# Patient Record
Sex: MALE | Race: OTHER | HISPANIC OR LATINO | Employment: OTHER | URBAN - METROPOLITAN AREA
[De-identification: names, ages, dates, MRNs, and addresses within clinical notes are randomized per-mention and may not be internally consistent; named-entity substitution may affect disease eponyms.]

---

## 2023-11-07 PROBLEM — H92.13 OTORRHEA OF BOTH EARS: Status: ACTIVE | Noted: 2023-11-07

## 2023-11-07 PROBLEM — H66.90 CHRONIC OTITIS MEDIA: Status: ACTIVE | Noted: 2023-11-07

## 2023-11-07 PROBLEM — G82.50 SPASTIC QUADRIPLEGIA (MULTI): Status: ACTIVE | Noted: 2023-11-07

## 2023-11-07 PROBLEM — Q26.2: Status: ACTIVE | Noted: 2023-11-07

## 2023-11-07 PROBLEM — G80.9 CEREBRAL PALSY (MULTI): Status: ACTIVE | Noted: 2023-11-07

## 2023-11-07 PROBLEM — Q37.9 CLEFT PALATE WITH CLEFT LIP (HHS-HCC): Status: ACTIVE | Noted: 2023-11-07

## 2023-11-07 PROBLEM — F72 SEVERE INTELLECTUAL DISABILITY: Status: ACTIVE | Noted: 2023-11-07

## 2023-11-07 PROBLEM — K21.9 ESOPHAGEAL REFLUX: Status: ACTIVE | Noted: 2023-11-07

## 2023-11-07 PROBLEM — R56.9 SEIZURES (MULTI): Status: ACTIVE | Noted: 2023-11-07

## 2023-11-07 PROBLEM — G40.919 INTRACTABLE EPILEPSY WITHOUT STATUS EPILEPTICUS (MULTI): Status: ACTIVE | Noted: 2023-11-07

## 2023-11-07 PROBLEM — H66.3X3 CHRONIC SUPPURATIVE OTITIS MEDIA OF BOTH EARS: Status: ACTIVE | Noted: 2023-11-07

## 2023-11-07 PROBLEM — R10.9 ABDOMINAL PAIN: Status: ACTIVE | Noted: 2023-11-07

## 2023-11-07 PROBLEM — F88 GLOBAL DEVELOPMENTAL DELAY: Status: ACTIVE | Noted: 2023-11-07

## 2023-11-07 PROBLEM — Z96.22 RETAINED BILATERAL MYRINGOTOMY TUBES: Status: ACTIVE | Noted: 2023-11-07

## 2023-11-07 PROBLEM — K11.7 SIALORRHEA: Status: ACTIVE | Noted: 2023-11-07

## 2023-11-07 PROBLEM — R79.89 LOW VITAMIN D LEVEL: Status: ACTIVE | Noted: 2023-11-07

## 2023-11-07 PROBLEM — F80.2 MIXED RECEPTIVE-EXPRESSIVE LANGUAGE DISORDER: Status: ACTIVE | Noted: 2023-11-07

## 2023-11-07 RX ORDER — BISACODYL 5 MG
TABLET, DELAYED RELEASE (ENTERIC COATED) ORAL
COMMUNITY
Start: 2016-07-13

## 2023-11-07 RX ORDER — LIDOCAINE AND PRILOCAINE 25; 25 MG/G; MG/G
CREAM TOPICAL
COMMUNITY
Start: 2018-05-31 | End: 2023-11-08

## 2023-11-07 RX ORDER — POLYETHYLENE GLYCOL 3350 17 G/17G
17 POWDER, FOR SOLUTION ORAL
COMMUNITY
Start: 2021-09-08

## 2023-11-07 RX ORDER — OXCARBAZEPINE 300 MG/1
TABLET, FILM COATED ORAL
COMMUNITY
Start: 2019-04-29 | End: 2023-11-08 | Stop reason: SDUPTHER

## 2023-11-07 RX ORDER — GLYCERIN 1 G/1
1 SUPPOSITORY RECTAL ONCE AS NEEDED
COMMUNITY
Start: 2014-01-22

## 2023-11-07 RX ORDER — FLUTICASONE PROPIONATE 50 MCG
SPRAY, SUSPENSION (ML) NASAL
COMMUNITY

## 2023-11-07 RX ORDER — OMEPRAZOLE 20 MG/1
1 TABLET, DELAYED RELEASE ORAL DAILY
COMMUNITY
End: 2023-11-08

## 2023-11-07 RX ORDER — DIAZEPAM 10 MG/2G
GEL RECTAL
COMMUNITY
Start: 2019-09-04

## 2023-11-07 RX ORDER — OMEPRAZOLE 20 MG/1
1 CAPSULE, DELAYED RELEASE ORAL DAILY
COMMUNITY
Start: 2014-12-15

## 2023-11-07 RX ORDER — CALCIUM CARBONATE/VITAMIN D3 600MG-5MCG
1 TABLET ORAL DAILY
COMMUNITY
Start: 2021-10-18 | End: 2023-11-08

## 2023-11-08 ENCOUNTER — TELEMEDICINE (OUTPATIENT)
Dept: NEUROLOGY | Facility: CLINIC | Age: 33
End: 2023-11-08
Payer: MEDICAID

## 2023-11-08 DIAGNOSIS — R56.9 SEIZURES (MULTI): ICD-10-CM

## 2023-11-08 DIAGNOSIS — G40.919 REFRACTORY EPILEPSY (MULTI): Primary | ICD-10-CM

## 2023-11-08 PROCEDURE — 99214 OFFICE O/P EST MOD 30 MIN: CPT | Mod: GT,PO | Performed by: PSYCHIATRY & NEUROLOGY

## 2023-11-08 PROCEDURE — 99204 OFFICE O/P NEW MOD 45 MIN: CPT | Performed by: PSYCHIATRY & NEUROLOGY

## 2023-11-08 RX ORDER — OXCARBAZEPINE 300 MG/1
TABLET, FILM COATED ORAL
Qty: 120 TABLET | Refills: 11 | Status: SHIPPED | OUTPATIENT
Start: 2023-11-08

## 2023-11-08 NOTE — PROGRESS NOTES
Mr. John is a 34 y/o with history of CP and seizures who presents today for follow-up.      Epilepsy Classification:  Epileptic Paroxysmal Episodes  Epileptogenic Zone: Likely Generalized  Epileptic seizure semiology:  1. Type 1: Bilateral arm clonic            Frequency: one in the last year   Etiology: Cerebral palsy                  Significant Comorbidities: CP, constipation  Onset date: Age 6  Diagnosis date: Age 6  Previous disease therapies: Keppra (Increased somnolence), Depakote (increased somnolence)  Current disease therapies: Oxcarbazepine 600 mg BID for months (2 tablets twice a day)     Today he presents with mom for a virtual visit.  While reviewing his medications mom informed patient has been taking OXC 600mg bid not 750mg bid for almost 2 years, this was adjusted down at [facility] after bloodwork. He has been doing well on this dose.  His current seizures are mild, lasting for 5 seconds only, may happen twice a week in the setting of constipation. Seizures may get worse if he gets sick with fever or ear infection.   His seizures have been stable for years. Mother is happy with overall control which is the best he has had since he was a child. He tried many different medications in the past. He loves watching TV and listing to music.     - We will continue Oxcarbazepine 600 mg BID- sent refills   - Mom provided with contact information and is to call if there's any increase in seizure frequency or any other concerns  -RTC annually    ------------------------------------------------------------------------------------------------------------------------------  Previous seizure history:    Mr. John is a 34 y/o with history of CP, constipation, and seizures who presents to [facility] care for. Sz onset at age 6. He had clonic seizures with cyanosis as a child.  Developed intractable epilepsy, tried multiple medications, Depakote among many other. Currently the oxcarbazepine, she feels is the  medication that gave him best seizure control without producing any significant side effects.      He has been followed initially by pediatric neurology, Doctors Spencer Brown and Elvin. He also saw Dr. Vogel of clinical genetics on at least one occasion.      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 Through comprehensive care referrals he is being followed by a number of services including pediatric G.I., plastic surgery, genetics and will be seeing pediatric orthopedics. 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

## 2024-09-14 ENCOUNTER — HOSPITAL ENCOUNTER (EMERGENCY)
Facility: HOSPITAL | Age: 34
Discharge: HOME | End: 2024-09-14
Attending: STUDENT IN AN ORGANIZED HEALTH CARE EDUCATION/TRAINING PROGRAM
Payer: MEDICAID

## 2024-09-14 ENCOUNTER — APPOINTMENT (OUTPATIENT)
Dept: RADIOLOGY | Facility: HOSPITAL | Age: 34
End: 2024-09-14
Payer: MEDICAID

## 2024-09-14 VITALS
OXYGEN SATURATION: 98 % | HEIGHT: 62 IN | RESPIRATION RATE: 16 BRPM | TEMPERATURE: 97.9 F | WEIGHT: 86 LBS | BODY MASS INDEX: 15.83 KG/M2 | HEART RATE: 98 BPM | SYSTOLIC BLOOD PRESSURE: 137 MMHG | DIASTOLIC BLOOD PRESSURE: 110 MMHG

## 2024-09-14 DIAGNOSIS — H65.92 OTHER NONSUPPURATIVE OTITIS MEDIA OF LEFT EAR, UNSPECIFIED CHRONICITY: Primary | ICD-10-CM

## 2024-09-14 DIAGNOSIS — K59.00 CONSTIPATION, UNSPECIFIED CONSTIPATION TYPE: ICD-10-CM

## 2024-09-14 DIAGNOSIS — R10.9 ABDOMINAL PAIN, UNSPECIFIED ABDOMINAL LOCATION: ICD-10-CM

## 2024-09-14 LAB
ALBUMIN SERPL BCP-MCNC: 5 G/DL (ref 3.4–5)
ALP SERPL-CCNC: 99 U/L (ref 33–120)
ALT SERPL W P-5'-P-CCNC: 42 U/L (ref 10–52)
ANION GAP SERPL CALC-SCNC: 15 MMOL/L (ref 10–20)
APPEARANCE UR: CLEAR
AST SERPL W P-5'-P-CCNC: 16 U/L (ref 9–39)
BASOPHILS # BLD AUTO: 0.03 X10*3/UL (ref 0–0.1)
BASOPHILS NFR BLD AUTO: 0.5 %
BILIRUB DIRECT SERPL-MCNC: 0.1 MG/DL (ref 0–0.3)
BILIRUB SERPL-MCNC: 0.4 MG/DL (ref 0–1.2)
BILIRUB UR STRIP.AUTO-MCNC: NEGATIVE MG/DL
BUN SERPL-MCNC: 7 MG/DL (ref 6–23)
CALCIUM SERPL-MCNC: 9.8 MG/DL (ref 8.6–10.6)
CHLORIDE SERPL-SCNC: 96 MMOL/L (ref 98–107)
CO2 SERPL-SCNC: 24 MMOL/L (ref 21–32)
COLOR UR: ABNORMAL
CREAT SERPL-MCNC: 0.6 MG/DL (ref 0.5–1.3)
EGFRCR SERPLBLD CKD-EPI 2021: >90 ML/MIN/1.73M*2
EOSINOPHIL # BLD AUTO: 0.03 X10*3/UL (ref 0–0.7)
EOSINOPHIL NFR BLD AUTO: 0.5 %
ERYTHROCYTE [DISTWIDTH] IN BLOOD BY AUTOMATED COUNT: 12.1 % (ref 11.5–14.5)
GLUCOSE SERPL-MCNC: 98 MG/DL (ref 74–99)
GLUCOSE UR STRIP.AUTO-MCNC: NORMAL MG/DL
HCT VFR BLD AUTO: 42.7 % (ref 41–52)
HGB BLD-MCNC: 14.9 G/DL (ref 13.5–17.5)
IMM GRANULOCYTES # BLD AUTO: 0.02 X10*3/UL (ref 0–0.7)
IMM GRANULOCYTES NFR BLD AUTO: 0.3 % (ref 0–0.9)
KETONES UR STRIP.AUTO-MCNC: ABNORMAL MG/DL
LEUKOCYTE ESTERASE UR QL STRIP.AUTO: ABNORMAL
LIPASE SERPL-CCNC: 22 U/L (ref 9–82)
LYMPHOCYTES # BLD AUTO: 1.39 X10*3/UL (ref 1.2–4.8)
LYMPHOCYTES NFR BLD AUTO: 23.7 %
MAGNESIUM SERPL-MCNC: 2.22 MG/DL (ref 1.6–2.4)
MCH RBC QN AUTO: 30.7 PG (ref 26–34)
MCHC RBC AUTO-ENTMCNC: 34.9 G/DL (ref 32–36)
MCV RBC AUTO: 88 FL (ref 80–100)
MONOCYTES # BLD AUTO: 0.74 X10*3/UL (ref 0.1–1)
MONOCYTES NFR BLD AUTO: 12.6 %
MUCOUS THREADS #/AREA URNS AUTO: NORMAL /LPF
NEUTROPHILS # BLD AUTO: 3.66 X10*3/UL (ref 1.2–7.7)
NEUTROPHILS NFR BLD AUTO: 62.4 %
NITRITE UR QL STRIP.AUTO: NEGATIVE
NRBC BLD-RTO: 0 /100 WBCS (ref 0–0)
PH UR STRIP.AUTO: 7 [PH]
PLATELET # BLD AUTO: 426 X10*3/UL (ref 150–450)
POTASSIUM SERPL-SCNC: 3.9 MMOL/L (ref 3.5–5.3)
PROT SERPL-MCNC: 8.2 G/DL (ref 6.4–8.2)
PROT UR STRIP.AUTO-MCNC: ABNORMAL MG/DL
RBC # BLD AUTO: 4.86 X10*6/UL (ref 4.5–5.9)
RBC # UR STRIP.AUTO: NEGATIVE /UL
RBC #/AREA URNS AUTO: NORMAL /HPF
SARS-COV-2 RNA RESP QL NAA+PROBE: NOT DETECTED
SODIUM SERPL-SCNC: 131 MMOL/L (ref 136–145)
SP GR UR STRIP.AUTO: 1.04
UROBILINOGEN UR STRIP.AUTO-MCNC: NORMAL MG/DL
WBC # BLD AUTO: 5.9 X10*3/UL (ref 4.4–11.3)
WBC #/AREA URNS AUTO: NORMAL /HPF

## 2024-09-14 PROCEDURE — 81001 URINALYSIS AUTO W/SCOPE: CPT

## 2024-09-14 PROCEDURE — 80048 BASIC METABOLIC PNL TOTAL CA: CPT

## 2024-09-14 PROCEDURE — 74177 CT ABD & PELVIS W/CONTRAST: CPT

## 2024-09-14 PROCEDURE — 74177 CT ABD & PELVIS W/CONTRAST: CPT | Performed by: RADIOLOGY

## 2024-09-14 PROCEDURE — 87086 URINE CULTURE/COLONY COUNT: CPT

## 2024-09-14 PROCEDURE — 99285 EMERGENCY DEPT VISIT HI MDM: CPT | Performed by: STUDENT IN AN ORGANIZED HEALTH CARE EDUCATION/TRAINING PROGRAM

## 2024-09-14 PROCEDURE — P9612 CATHETERIZE FOR URINE SPEC: HCPCS

## 2024-09-14 PROCEDURE — 87635 SARS-COV-2 COVID-19 AMP PRB: CPT

## 2024-09-14 PROCEDURE — 96374 THER/PROPH/DIAG INJ IV PUSH: CPT | Mod: 59

## 2024-09-14 PROCEDURE — 83690 ASSAY OF LIPASE: CPT

## 2024-09-14 PROCEDURE — 85025 COMPLETE CBC W/AUTO DIFF WBC: CPT

## 2024-09-14 PROCEDURE — 99284 EMERGENCY DEPT VISIT MOD MDM: CPT | Mod: 25

## 2024-09-14 PROCEDURE — 82248 BILIRUBIN DIRECT: CPT

## 2024-09-14 PROCEDURE — 96361 HYDRATE IV INFUSION ADD-ON: CPT

## 2024-09-14 PROCEDURE — 2550000001 HC RX 255 CONTRASTS: Mod: SE | Performed by: STUDENT IN AN ORGANIZED HEALTH CARE EDUCATION/TRAINING PROGRAM

## 2024-09-14 PROCEDURE — 71045 X-RAY EXAM CHEST 1 VIEW: CPT | Performed by: STUDENT IN AN ORGANIZED HEALTH CARE EDUCATION/TRAINING PROGRAM

## 2024-09-14 PROCEDURE — 83735 ASSAY OF MAGNESIUM: CPT

## 2024-09-14 PROCEDURE — 96375 TX/PRO/DX INJ NEW DRUG ADDON: CPT

## 2024-09-14 PROCEDURE — 71045 X-RAY EXAM CHEST 1 VIEW: CPT

## 2024-09-14 PROCEDURE — 2500000004 HC RX 250 GENERAL PHARMACY W/ HCPCS (ALT 636 FOR OP/ED): Mod: SE

## 2024-09-14 PROCEDURE — 36415 COLL VENOUS BLD VENIPUNCTURE: CPT

## 2024-09-14 RX ORDER — AMOXICILLIN 875 MG/1
875 TABLET, FILM COATED ORAL 2 TIMES DAILY
Qty: 14 TABLET | Refills: 0 | Status: SHIPPED | OUTPATIENT
Start: 2024-09-14 | End: 2024-09-21

## 2024-09-14 RX ORDER — ONDANSETRON HYDROCHLORIDE 2 MG/ML
4 INJECTION, SOLUTION INTRAVENOUS ONCE
Status: COMPLETED | OUTPATIENT
Start: 2024-09-14 | End: 2024-09-14

## 2024-09-14 RX ORDER — MORPHINE SULFATE 4 MG/ML
4 INJECTION INTRAVENOUS ONCE
Status: COMPLETED | OUTPATIENT
Start: 2024-09-14 | End: 2024-09-14

## 2024-09-14 RX ORDER — BISACODYL 10 MG/1
10 SUPPOSITORY RECTAL DAILY
Qty: 12 SUPPOSITORY | Refills: 0 | Status: SHIPPED | OUTPATIENT
Start: 2024-09-14 | End: 2024-09-26

## 2024-09-14 RX ADMIN — IOHEXOL 75 ML: 350 INJECTION, SOLUTION INTRAVENOUS at 18:07

## 2024-09-14 RX ADMIN — ONDANSETRON 4 MG: 2 INJECTION INTRAMUSCULAR; INTRAVENOUS at 17:04

## 2024-09-14 RX ADMIN — MORPHINE SULFATE 4 MG: 4 INJECTION, SOLUTION INTRAMUSCULAR; INTRAVENOUS at 17:04

## 2024-09-14 RX ADMIN — SODIUM CHLORIDE, POTASSIUM CHLORIDE, SODIUM LACTATE AND CALCIUM CHLORIDE 1000 ML: 600; 310; 30; 20 INJECTION, SOLUTION INTRAVENOUS at 18:23

## 2024-09-14 ASSESSMENT — LIFESTYLE VARIABLES
EVER HAD A DRINK FIRST THING IN THE MORNING TO STEADY YOUR NERVES TO GET RID OF A HANGOVER: NO
HAVE YOU EVER FELT YOU SHOULD CUT DOWN ON YOUR DRINKING: NO
EVER FELT BAD OR GUILTY ABOUT YOUR DRINKING: NO
TOTAL SCORE: 0
HAVE PEOPLE ANNOYED YOU BY CRITICIZING YOUR DRINKING: NO

## 2024-09-14 ASSESSMENT — PAIN - FUNCTIONAL ASSESSMENT
PAIN_FUNCTIONAL_ASSESSMENT: WONG-BAKER FACES
PAIN_FUNCTIONAL_ASSESSMENT: UNABLE TO SELF-REPORT

## 2024-09-14 ASSESSMENT — COLUMBIA-SUICIDE SEVERITY RATING SCALE - C-SSRS
1. IN THE PAST MONTH, HAVE YOU WISHED YOU WERE DEAD OR WISHED YOU COULD GO TO SLEEP AND NOT WAKE UP?: NO
2. HAVE YOU ACTUALLY HAD ANY THOUGHTS OF KILLING YOURSELF?: NO
6. HAVE YOU EVER DONE ANYTHING, STARTED TO DO ANYTHING, OR PREPARED TO DO ANYTHING TO END YOUR LIFE?: NO

## 2024-09-14 ASSESSMENT — PAIN SCALES - WONG BAKER: WONGBAKER_NUMERICALRESPONSE: NO HURT

## 2024-09-14 NOTE — ED TRIAGE NOTES
"Pt brought in by mom, states that everytime she feeds him his eyes go wide, stomach makes \"gurgling\" noises and then he does not want to continue eating.  States that pt has BM daily, denies vomiting, mother also states that pt has been having increased seizures. Compliant with seizure meds, mother states that pt has seizures when pt is sick.  Hx of seizures, cerebral palsy, scoliosis  "

## 2024-09-14 NOTE — DISCHARGE INSTRUCTIONS
You were seen today in the ED for abdominal pain, constipation, and a left ear infection.  You were given fluids, pain medication, and medicine to help with your nausea.  You were also given an enema.  You were given outpatient prescriptions for antibiotics and Dulcolax, please pick these up from your pharmacy as soon as possible and follow the instructions on the label.  Please see your primary care provider soon as possible.  Please return to the ED for any worsening symptoms including but not limited to persistent and worsening abdominal pain, fever, chills, persistent vomiting, and inability to pass gas.

## 2024-09-14 NOTE — ED PROVIDER NOTES
Emergency Department Provider Note        History of Present Illness     History provided by: Patient and Parent  Limitations to History:  Patient has spastic quadriplegia from cerebral palsy  External Records Reviewed with Brief Summary: None    HPI:  Paul John is a 34 y.o. male presenting to the [facility] Emergency Department with his mother after his mother noticed that her send has a foul-smelling urine and has had refractory seizures for the past 2 nights.  The patient is a spastic quadriplegic cerebral palsy patient with scoliosis and takes medication for seizures.  The mother believes that the patient has been experiencing fever, the patient has not been coughing or vomiting or having any diarrhea.  Patient has been saying that his abdomen hurts to his mother, the patient is also concerned that his ear has been hurting to his mother.    Physical Exam   Triage vitals:  T 36.6 °C (97.9 °F)  HR 96  BP (!) 131/94  RR 16  O2 98 % None (Room air)    Physical Exam  Vitals and nursing note reviewed.   Constitutional:       Comments: Patient is nonverbal and able to still respond to some external factors.  The mother the patient says that he has decreased response currently.   HENT:      Head: Normocephalic and atraumatic.      Right Ear: External ear normal.      Left Ear: External ear normal. No drainage. Tympanic membrane is erythematous.   Cardiovascular:      Rate and Rhythm: Normal rate and regular rhythm.      Heart sounds: Normal heart sounds.   Pulmonary:      Effort: Pulmonary effort is normal.      Breath sounds: Normal breath sounds.   Abdominal:      Palpations: Abdomen is soft.      Tenderness: There is abdominal tenderness (Patient was not able to say yes or no to tenderness but patient did react to palpation implying that it was painful).   Skin:     General: Skin is warm and dry.      Capillary Refill: Capillary refill takes less than 2 seconds.   Neurological:      General: No focal  deficit present.      Mental Status: He is alert.          Medical Decision Making & ED Course   Medical Decision Makin y.o. male arrives to the [facility] emergency department with his mother for treatment in which the mother believes that it is abdominal pain.  The patient is nonverbal and cannot expressly say what region body is affecting him.  The patient has an earache in which his otitis media is affecting him the mother can not see drainage from the ear. The patient has been holding his episodes of bowel movements and has been known to have holding episodes before.  Even though he does have bowel movements he does not fully relieve himself.    Labs and imaging were ordered to assess for a wide array of what pathology could possibly be affecting the patient.    Labs and imaging were returning with values not indicating an infectious cause for this acute pathology.  The patient was found to have a large rectal stool ball noted on CT as well as otitis media with the otoscope.  The otitis media was able to be treated on an outpatient therapy, I had not yet discharge the patient but did sign the patient out to the next resident who knows of what the plan is with this patient's otitis media.    Manual disimpaction was offered to the patient for the large rectal stool ball and the caregiver declined the procedure asking for an enema and instead will use suppositories for outpatient treatment.    Final disposition of the patient will be based on the urinalysis results and the official CT scan read.    ----      Differential diagnoses considered include but are not limited to: Gastroparesis, constipation, otitis media     Social Determinants of Health which Significantly Impact Care:  Having multiple comorbidities and relying on the caregiver for day-to-day activities  The following actions were taken to address these social determinants: Encouraged the caregiver and provide her with information on how to better  care for the patient    EKG Independent Interpretation: EKG not obtained    Independent Result Review and Interpretation: Relevant laboratory and radiographic results were reviewed and independently interpreted by myself.  As necessary, they are commented on in the ED Course.    Chronic conditions affecting the patient's care: As documented above in [facility]    The patient was discussed with the following consultants/services: None    Care Considerations: As documented above in [facility]    ED Course:  ED Course as of 09/14/24 2144   Sat Sep 14, 2024   1711 XR chest 1 view  X-ray of the chest shows no evidence of an acute cardiopulmonary pathology [EVENS]   1901 Attending summary:  35 y/o M with PMH CP with spastic quadriplegia, epilepsy presenting for abdominal pain. Mom reports for last 2-3 days, seems to be uncomfortable after eating. +subjective fevers. +foul smelling urine. +constipation. No vomiting, increased WOB, cough. Has had breakthrough seizures which mom reports usually happens when sick. +COVID contact. No prior intraabd surgical history.     Vitals unremarkable. Exam shows chronically ill appearing male, no peritonitis but grimacing when palpating abdomen diffusely.     Highest concern for intraabd process with symptoms. Aspiration or pneumonia considered as well and will obtain CXR. No known fevers or cough. UTI or COVID possible as well. Plan for labs, UA, COVID swab, CT a/p and symptom control. Dispo pending work-up and re-eval.  [SS]   2134 CT abdomen pelvis w IV contrast  CT of the chest shows no acute etiology for an acute abdomen and there is a large rectal stool ball noted [EVENS]      ED Course User Index  [EVENS] Neftaly Mendez DO  [SS] Lisa Drummond MD         Diagnoses as of 09/14/24 2144   Other nonsuppurative otitis media of left ear, unspecified chronicity   Abdominal pain, unspecified abdominal location     Disposition   Patient was signed out to Dr. Ureña at 7:30 pm pending completion of their  work-up.  Please see the next provider's transition of care note for the remainder of the patient's care.     Procedures   Procedures    Patient seen and discussed with ED attending physician.    Neftaly Mendez,   Emergency Medicine     Neftaly Mendez DO  Resident  09/14/24 2955

## 2024-09-15 LAB — HOLD SPECIMEN: NORMAL

## 2024-09-15 NOTE — PROGRESS NOTES
Emergency Department Transition of Care Note       Signout   I received Paul John in signout from Dr. Mendez.  Please see the ED Provider Note for all HPI, PE and MDM up to the time of signout at 6PM.  This is in addition to the primary record.    In brief Paul John is an 34 y.o. male with hx of cerebral palsy, scoliosis, and quadriplegia presenting for abdominal pain and ear pain. Patient was found to have large stool ball and otitis media in left ear.     At the time of signout we were awaiting:  enema and UA    ED Course & Medical Decision Making   Medical Decision Making:  Under my care, patient was given enema.  UA result came back, no UTI was seen.  Patient's mother/legal guardian said that the patient would be unable to go to the bathroom here as there were too many distractions, and it would be easier for the patient to go at home.  Patient was appropriate for discharge at this time, and was given prescriptions for Dulcolax for constipation and antibiotics for ear infection.  Patient's guardian was agreeable to discharge.    ED Course:  ED Course as of 09/14/24 2245   Sat Sep 14, 2024   1711 XR chest 1 view  X-ray of the chest shows no evidence of an acute cardiopulmonary pathology [EVENS]   1901 Attending summary:  35 y/o M with PMH CP with spastic quadriplegia, epilepsy presenting for abdominal pain. Mom reports for last 2-3 days, seems to be uncomfortable after eating. +subjective fevers. +foul smelling urine. +constipation. No vomiting, increased WOB, cough. Has had breakthrough seizures which mom reports usually happens when sick. +COVID contact. No prior intraabd surgical history.     Vitals unremarkable. Exam shows chronically ill appearing male, no peritonitis but grimacing when palpating abdomen diffusely.     Highest concern for intraabd process with symptoms. Aspiration or pneumonia considered as well and will obtain CXR. No known fevers or cough. UTI or COVID possible as well.  Plan for labs, UA, COVID swab, CT a/p and symptom control. Dispo pending work-up and re-eval.  [SS]   2134 CT abdomen pelvis w IV contrast  CT of the chest shows no acute etiology for an acute abdomen and there is a large rectal stool ball noted [EVENS]      ED Course User Index  [EVENS] Neftaly Mendez DO  [SS] Lisa Drummond MD         Diagnoses as of 09/14/24 1431   Other nonsuppurative otitis media of left ear, unspecified chronicity   Abdominal pain, unspecified abdominal location   Constipation, unspecified constipation type       Disposition   As a result of the work-up, the patient was discharged home.  he was informed of his diagnosis and instructed to come back with any concerns or worsening of condition.  he and was agreeable to the plan as discussed above.  he was given the opportunity to ask questions.  All of the patient's questions were answered.    Procedures   Procedures    Patient seen and discussed with ED attending physician.    Lynette Ureña,   Emergency Medicine

## 2024-09-17 ENCOUNTER — OFFICE VISIT (OUTPATIENT)
Dept: URGENT CARE | Age: 34
End: 2024-09-17
Payer: MEDICAID

## 2024-09-17 VITALS
DIASTOLIC BLOOD PRESSURE: 81 MMHG | HEIGHT: 62 IN | RESPIRATION RATE: 18 BRPM | BODY MASS INDEX: 15.83 KG/M2 | TEMPERATURE: 97.2 F | HEART RATE: 71 BPM | OXYGEN SATURATION: 100 % | WEIGHT: 86 LBS | SYSTOLIC BLOOD PRESSURE: 120 MMHG

## 2024-09-17 DIAGNOSIS — R31.9 URINARY TRACT INFECTION WITH HEMATURIA, SITE UNSPECIFIED: Primary | ICD-10-CM

## 2024-09-17 DIAGNOSIS — N39.0 URINARY TRACT INFECTION WITH HEMATURIA, SITE UNSPECIFIED: Primary | ICD-10-CM

## 2024-09-17 DIAGNOSIS — F81.89 DEVELOPMENTAL NON-VERBAL DISORDER: ICD-10-CM

## 2024-09-17 NOTE — PROGRESS NOTES
"Subjective   Patient ID: Paul John is a 34 y.o. male. They present today with a chief complaint of Difficulty Urinating (6 days pink urine).    History of Present Illness  Paul is a 34-year-old nonverbal male who is wheelchair-bound with cerebral palsy who presents accompanied by the patient's guardian and caregiver for evaluation of \"pink urination\".  The patient's caregiver states they were seen in the [facility] ER on Saturday, 9/14/2024 and the patient was diagnosed with constipation.  The patient's caregiver denies knowing diagnosis of UTI and states \"the ER was fixated on the patient's constipation instead\".  Caregivers concerned given pink urination though notes improvement in fever which was initially recorded at 100.2 Fahrenheit rectally over the weekend.  Patient's caregiver denies signs of distress or worsening symptoms otherwise.  Patient's caregiver admits to providing and administering amoxicillin from a old prescription for the patient's symptoms.  No vomiting or diarrhea reported.  No other symptoms or concerns otherwise reported.    Past Medical History  Allergies as of 09/17/2024 - Reviewed 09/17/2024   Allergen Reaction Noted    Citrus and derivatives Anaphylaxis 11/07/2023    Ciprofloxacin Unknown 11/07/2023    Ciprofloxacin-dexamethasone Unknown 11/07/2023    Cocoa Other 11/07/2023    Baclofen Rash 11/07/2023    Sulfamethoxazole-trimethoprim Rash 11/07/2023       (Not in a hospital admission)       Past Medical History:   Diagnosis Date    Epilepsy, unspecified, not intractable, without status epilepticus (Multi) 05/31/2018    Seizure disorder    Personal history of (corrected) cleft lip and palate 05/31/2018    History of cleft palate    Personal history of other diseases of the digestive system     History of gastroesophageal reflux (GERD)    Personal history of other diseases of the nervous system and sense organs 05/31/2018    History of cerebral palsy          Review of " "Systems  A 10-point review of systems was performed, otherwise unremarkable unless stated in the history of present illness.              Objective    Vitals:    09/17/24 1149   BP: 120/81   BP Location: Left arm   Patient Position: Sitting   BP Cuff Size: Adult   Pulse: 71   Resp: 18   Temp: 36.2 °C (97.2 °F)   TempSrc: Oral   SpO2: 100%   Weight: (!) 39 kg (86 lb)   Height: 1.575 m (5' 2\")     No LMP for male patient.    Gen: Vitals noted and reviewed, no evidence of acute distress, frail appearing and afebrile.   Psych: Non-verbal.  Head/Face: Atraumatic and normocephalic.   Neuro: Non-verbal, developmental delay noted and wheelchair bound.   Cardiac: Regular rate and rhythm no murmur.   Lungs: Clear to auscultation throughout, No evidence of wheezing, rhonchi or crackles. Good aeration throughout.   Extremities: Symmetrical, No peripheral edema  Abdomen: Soft, minimally tender without guarding. No obvious CVA tenderness though difficult to discern given non-verbal status.   Skin: Without evidence of ecchymosis, wounds, or rashes.      Point of Care Test & Imaging Results from this visit  No results found for this visit on 09/17/24.   No results found.    Diagnostic study results (if any) were reviewed by Ruby Olivares DO.    **Patient unable to provide urine sample in office today; U-culture from 9/14/24 reviewed in [facility].     Assessment/Plan   Allergies, medications, history, and pertinent labs/EKGs/Imaging reviewed by Ruby Olivares DO.     Medical Decision Making  Discussed with the patient's caregiver findings in EMR of urine culture positive for bacteria with preliminary results and pending sensitivities and susceptibilities.  We advised to  prescribed amoxicillin by the ER and to begin treatment with this however if symptoms fail to improve to revisit the ER as the patient would need a straight cath for reevaluation and possible reimaging if symptoms fail to improve or worsen.  Patient was unable to " provide a urine sample in office today, we will base treatment plan off of prior ER based urine culture and advised against use of previous prescription and to begin new prescription for treatment.  We thoroughly reviewed red flag symptoms of UTIs and advised given the patient's nonverbal status in the setting of cerebral palsy and wheelchair-bound to have a low threshold to go to the emergency department for additional treatment, IV fluids, etc. If continues to have concerns given prior treatment plan, go to ED for straight cath and further assessment. The patient's caregiver voiced full understanding and agreement to this plan.     Orders and Diagnoses  Diagnoses and all orders for this visit:  Urinary tract infection with hematuria, site unspecified  Developmental non-verbal disorder      Medical Admin Record      Follow Up Instructions:   Have a low threshold to go to the emergency department for additional treatment, IV fluids, etc. If continues to have concerns given prior treatment plan, go to ED for straight cath and further assessment. The patient's caregiver voiced full understanding and agreement to this plan.     No follow-ups on file.    Patient disposition: Home    Electronically signed by Ruby Olivares DO  3:13 PM

## 2024-09-17 NOTE — PATIENT INSTRUCTIONS
Follow up with PCP. We advised seeking immediate emergency medical attention if symptoms fail to improve, worsen or any concerning symptoms arise. Patient voiced full understanding and agreement to plan.

## 2024-09-18 ENCOUNTER — TELEPHONE (OUTPATIENT)
Dept: PHARMACY | Facility: HOSPITAL | Age: 34
End: 2024-09-18
Payer: MEDICAID

## 2024-09-18 LAB
BACTERIA UR CULT: ABNORMAL
BACTERIA UR CULT: ABNORMAL
LABORATORY COMMENT REPORT: ABNORMAL

## 2024-09-18 NOTE — PROGRESS NOTES
EDPD Note: Rapid Result Review    Reviewed Mr./Mrs./Ms. Paul John 's chart regarding a contaminated urine culture/result that was taken during their recent emergency room visit. The patient was not told about these results prior to leaving the emergency department. Therefore, patient was contacted and given proper education.     Patient is a quadriplegic from cerebral palsy and was brought to the ED by his mother due to concern for foul smelling urine and refractory seizures. Mother states patient has been experiencing a fever along and has been complaining of abdomen pain.  Mother  denies that he has  been coughing, vomiting or having any diarrhea. Patient was not discharged on antibiotics for UTI. On Amoxicillin for ear infection. When writer called mother she stated that patient is still experiencing urinary symptoms and will set up an appointment with PCP to get another urine culture.       Susceptibility data from last 90 days.  Collected Specimen Info Organism   09/14/24 Urine from Straight Catheter Gram Negative Bacilli       No further follow up needed from EDPD Team.     ELOISA MARTINEZ

## 2024-11-22 DIAGNOSIS — R56.9 SEIZURES (MULTI): ICD-10-CM

## 2024-11-22 DIAGNOSIS — G40.919 REFRACTORY EPILEPSY (MULTI): ICD-10-CM

## 2024-11-26 ENCOUNTER — TELEMEDICINE (OUTPATIENT)
Dept: NEUROLOGY | Facility: CLINIC | Age: 34
End: 2024-11-26
Payer: MEDICAID

## 2024-11-26 DIAGNOSIS — G40.919 REFRACTORY EPILEPSY (MULTI): ICD-10-CM

## 2024-11-26 DIAGNOSIS — R56.9 SEIZURES (MULTI): ICD-10-CM

## 2024-11-26 PROCEDURE — 99213 OFFICE O/P EST LOW 20 MIN: CPT | Performed by: NURSE PRACTITIONER

## 2024-11-26 PROCEDURE — 99213 OFFICE O/P EST LOW 20 MIN: CPT | Mod: GT | Performed by: NURSE PRACTITIONER

## 2024-11-26 RX ORDER — OXCARBAZEPINE 300 MG/1
TABLET, FILM COATED ORAL
Qty: 120 TABLET | Refills: 11 | Status: SHIPPED | OUTPATIENT
Start: 2024-11-26

## 2024-11-26 RX ORDER — DIAZEPAM 10 MG/2G
10 GEL RECTAL AS NEEDED
Qty: 2 EACH | Refills: 2 | Status: SHIPPED | OUTPATIENT
Start: 2024-11-26 | End: 2025-05-25

## 2024-11-26 NOTE — PROGRESS NOTES
[facility]   Epilepsy    Virtual or Telephone Consent    A telephone visit (audio only) between the patient (at the originating site) and the provider (at the distant site) was utilized to provide this telehealth service.   Verbal consent was requested and obtained from Paul John on this date, 11/26/24 for a telehealth visit.      Patient ID: Paul John 34 y.o.male presenting in follow-up for previously diagnosed epilepsy  Patient of: Dr. Wood Henderson    [facility] [place]  Epilepsy Classification:  Epileptic Paroxysmal Episodes  Epileptogenic Zone: Likely Generalized  Epileptic seizure semiology:  1. Type 1: Bilateral arm clonic            Frequency: one in the last year   Etiology: Cerebral palsy                  Significant Comorbidities: CP, constipation  Onset date: Age 6  Diagnosis date: Age 6  Previous disease therapies: Keppra (Increased somnolence), Depakote (increased somnolence)  Current disease therapies: Oxcarbazepine 600 mg BID for months (2 tablets twice a day)      PRESENT CONCERNS:  Seizures are stable, they need refills . Only has seizures if constipated or sick and they remain brief. Mom has not needed rescue medication in years.       Review of Systems  All other systems reviewed and negative unless otherwise stated above    CONTROLLED SUBSTANCE  OARRS:  No data recorded  I have personally reviewed the OARRS report for Paul John. I have considered the risks of abuse, dependence, addiction and diversion and I believe that it is clinically appropriate for Paul John to be prescribed this medication    Is the patient prescribed a combination of a benzodiazepine and opioid?  No    Last Urine Drug Screen / ordered today: No  No results found for this or any previous visit (from the past 8760 hours).    Clinical rationale for not completing a Urine Drug Screen: Patient prescribed controlled substance for management of seizure, epilepsy, movement  disorder      Vitals:  There were no vitals filed for this visit.    PHYSICAL EXAM:  This examination was performed over telehealth by telephone discussion--- Patient has limited verbal function at baseline, did not come to phone during visit. Further objective neurological testing not possible given nature of phone interview.    ASSESSMENT & PLAN:   34 y.o. male presenting in follow-up for previously diagnosed epilepsy    Problem List Items Addressed This Visit       Seizures (Multi)    Relevant Medications    OXcarbazepine (Trileptal) 300 mg tablet     Other Visit Diagnoses       Refractory epilepsy (Multi)        Relevant Medications    OXcarbazepine (Trileptal) 300 mg tablet          Paul is well controlled on OXC 600mg bid with infrequent, brief tonic seizures,     Continue -600  Will send in rescue as previous - has not been needed in many years  RTC 12 months   please follow seizure precautions which include no driving until 6 months seizure free and cleared by a provider  Call me with any seizures prior to your next appointment   Given my contact information/instructions for Brendan

## 2024-11-27 RX ORDER — OXCARBAZEPINE 300 MG/1
TABLET, FILM COATED ORAL
Qty: 120 TABLET | Refills: 11 | Status: SHIPPED | OUTPATIENT
Start: 2024-11-27

## 2025-05-28 ENCOUNTER — APPOINTMENT (OUTPATIENT)
Dept: OTOLARYNGOLOGY | Facility: CLINIC | Age: 35
End: 2025-05-28
Payer: MEDICAID

## 2025-05-29 ENCOUNTER — OFFICE VISIT (OUTPATIENT)
Facility: CLINIC | Age: 35
End: 2025-05-29
Payer: MEDICAID

## 2025-05-29 ENCOUNTER — APPOINTMENT (OUTPATIENT)
Facility: CLINIC | Age: 35
End: 2025-05-29
Payer: MEDICAID

## 2025-05-29 ENCOUNTER — PHARMACY VISIT (OUTPATIENT)
Dept: PHARMACY | Facility: CLINIC | Age: 35
End: 2025-05-29
Payer: MEDICAID

## 2025-05-29 VITALS — BODY MASS INDEX: 15.83 KG/M2 | HEIGHT: 62 IN | WEIGHT: 86 LBS

## 2025-05-29 DIAGNOSIS — H66.3X2 OTHER CHRONIC SUPPURATIVE OTITIS MEDIA OF LEFT EAR: Primary | ICD-10-CM

## 2025-05-29 DIAGNOSIS — H92.02 EAR PAIN, LEFT: ICD-10-CM

## 2025-05-29 PROCEDURE — RXMED WILLOW AMBULATORY MEDICATION CHARGE

## 2025-05-29 PROCEDURE — 99213 OFFICE O/P EST LOW 20 MIN: CPT | Performed by: PHYSICIAN ASSISTANT

## 2025-05-29 PROCEDURE — 3008F BODY MASS INDEX DOCD: CPT | Performed by: PHYSICIAN ASSISTANT

## 2025-05-29 RX ORDER — CIPROFLOXACIN AND DEXAMETHASONE 3; 1 MG/ML; MG/ML
4 SUSPENSION/ DROPS AURICULAR (OTIC) 2 TIMES DAILY
Qty: 7.5 ML | Refills: 3 | Status: SHIPPED | OUTPATIENT
Start: 2025-05-29 | End: 2025-06-17

## 2025-05-29 ASSESSMENT — PAIN SCALES - GENERAL: PAINLEVEL_OUTOF10: 0-NO PAIN

## 2025-05-29 NOTE — PROGRESS NOTES
Facial Plastic & Reconstructive Surgery    Reason for consult: Sinus issues    Chief Complaint: Left ear pain    Referring Provider: Self    Previous Otolaryngology Provider: Dr. Valera, Rima Raman, Dr. Lozano    Previous documentation for this patient was extensively reviewed including specific reasons for evaluation. Complex nature of complaint and medical issues prompting evaluation for surgical consideration by facial plastic & reconstructive surgeon.    Paul John is a 34 y.o. male with PMHx of cerebral plasty, seizures, severe intellectual disability, spastic quadriplegia, cleft palate/lip, chronic otitis media s/p bl tubes as an infant with Dr. Lozano, total anomalous pulmonary venous return , who presents in consultation for the evaluation of chronic sinus infections; treated with two courses of antibiotics over the past month. Presents with mother Milli today who is providing patient's history.    Currently no sinus issues; No more nasal drainage or headache, however mother notes Segundo is wincing from pain due to his left ear.     IMAGING: I personally reviewed the CT HEAD from  4/5/22 and this is my impression: Retention cyst/polypoid mucosal thickening of right max sinus, near complete opacification of right frontal sinus.       Past Medical History  He has a past medical history of Epilepsy, unspecified, not intractable, without status epilepticus (Multi) (05/31/2018), Personal history of (corrected) cleft lip and palate (05/31/2018), Personal history of other diseases of the digestive system, and Personal history of other diseases of the nervous system and sense organs (05/31/2018).    Surgical History  He has a past surgical history that includes Gastrostomy tube placement (05/31/2018); Other surgical history (03/12/2019); Other surgical history (03/12/2019); and Other surgical history (03/12/2019).     Social History  He has no history on file for tobacco use, alcohol use, and drug  use.    Family History  Family History[1]     Allergies  Citrus and derivatives, Ciprofloxacin, Ciprofloxacin-dexamethasone, Cocoa, Baclofen, and Sulfamethoxazole-trimethoprim    Physical exam    General: Well-developed and well-nourished in appearance.  Skin: No rashes or concerning lesions on the visible portions of the skin.  Eyes: Extraocular movements intact. Visual fields grossly normal.  Ears: Pinna are normal in shape and position. Left external ear canal blocked with earwax, removed today revealing complete opacification of left TM a/w bulging, possible cholesteatoma. Right TM bulging with serous fluid.   Oral Cavity/Oropharynx: Dentition is intact. Mucous membranes moist. No masses or lesions.  Respiratory: No respiratory distress. Quiet breathing without stertor or stridor.  Cardiovascular: Regular rate and rhythm. Warm extremities with equal pulses.  Psych: Normal mood and affect. Judgement and insight appropriate.  Neuro: Alert and oriented. CN II-XII grossly intact. No focal deficits.  Musculoskeletal: Gait intact. Moves all extremities well without apparent deformities.    Plan:  Ciprofloxacin otic drops for suppurative otitis media of left ear  Otology referral for chronic otitis media/possible cholesteatoma and for future management (cleanings/possible tubes/etc.)    Kamila Omer PA-C         [1]   Family History  Problem Relation Name Age of Onset    Hypertension Mother      Cleft palate Mother's Sister      Cleft palate Mother's Brother

## 2025-06-11 ENCOUNTER — OFFICE VISIT (OUTPATIENT)
Dept: OTOLARYNGOLOGY | Facility: HOSPITAL | Age: 35
End: 2025-06-11
Payer: MEDICAID

## 2025-06-11 VITALS — TEMPERATURE: 98.2 F | HEART RATE: 80 BPM | SYSTOLIC BLOOD PRESSURE: 123 MMHG | DIASTOLIC BLOOD PRESSURE: 85 MMHG

## 2025-06-11 DIAGNOSIS — H60.393 OTHER INFECTIVE CHRONIC OTITIS EXTERNA OF BOTH EARS: Primary | ICD-10-CM

## 2025-06-11 DIAGNOSIS — H61.23 BILATERAL IMPACTED CERUMEN: ICD-10-CM

## 2025-06-11 DIAGNOSIS — H65.23 BILATERAL CHRONIC SEROUS OTITIS MEDIA: ICD-10-CM

## 2025-06-11 PROBLEM — H60.63 CHRONIC OTITIS EXTERNA OF BOTH EARS: Status: ACTIVE | Noted: 2025-06-11

## 2025-06-11 PROCEDURE — 99214 OFFICE O/P EST MOD 30 MIN: CPT | Mod: 25 | Performed by: OTOLARYNGOLOGY

## 2025-06-11 PROCEDURE — 69210 REMOVE IMPACTED EAR WAX UNI: CPT | Performed by: OTOLARYNGOLOGY

## 2025-06-11 PROCEDURE — 99214 OFFICE O/P EST MOD 30 MIN: CPT | Performed by: OTOLARYNGOLOGY

## 2025-06-11 RX ORDER — TOBRAMYCIN AND DEXAMETHASONE 3; 1 MG/ML; MG/ML
3 SUSPENSION/ DROPS OPHTHALMIC
Qty: 5 ML | Refills: 1 | Status: SHIPPED | OUTPATIENT
Start: 2025-06-11 | End: 2025-06-25

## 2025-06-11 ASSESSMENT — ENCOUNTER SYMPTOMS
LOSS OF SENSATION IN FEET: 0
DEPRESSION: 0
OCCASIONAL FEELINGS OF UNSTEADINESS: 1

## 2025-06-11 NOTE — LETTER
June 12, 2025     CODY Ashley-CNP  [address] [facility] [address]    Patient: Paul John   YOB: 1990   Date of Visit: 6/11/2025       Dear Dr. Latoya Albarado, CODY-CNP:    Thank you for referring Paul John to me for evaluation. Below are my notes for this consultation.  If you have questions, please do not hesitate to call me. I look forward to following your patient along with you.       Sincerely,     Javier Randhawa MD      CC: Cain Yanez MD  ______________________________________________________________________________________            Reason for Consult:  ear check follow up visit     Subjective  History Of Present Illness:  Paul John is a 35 y.o. male with a history of CP and developmental delay as well as cleft palate/lip and chronic otitis media. He was previously followed by Dr. Lozano before he retired. He has had PE tubes in place for almost his whole life, though they were removed at his last visit in 10/2022 due to chronic otorrhea from tubes retained > 30 years. He was not seen by me after that and did well until mid-May when he started picking more at his left ear as though it was hurting. He was seen by his PA and eventually Kamila Omer PA-C. He has been on amoxicillin, Augmentin, and ciprofloxacin drops with questionable improvement. Mom does not think he has had significant subjective changes in his hearing. No known recent URIs or water exposure to the ears.     Past Medical History:  He has a past medical history of Epilepsy, unspecified, not intractable, without status epilepticus (05/31/2018), Personal history of (corrected) cleft lip and palate (05/31/2018), Personal history of other diseases of the digestive system, and Personal history of other diseases of the nervous system and sense organs (05/31/2018).    Surgical History:  He has a past surgical history that includes Gastrostomy tube placement  (05/31/2018); Other surgical history (03/12/2019); Other surgical history (03/12/2019); and Other surgical history (03/12/2019).     Social History:  He reports that he has never smoked. He has never been exposed to tobacco smoke. He has never used smokeless tobacco. No history on file for alcohol use and drug use.    Family History:  family history includes Cleft palate in his mother's brother and mother's sister; Hypertension in his mother.     Medications:  Current Outpatient Medications   Medication Instructions   • ciprofloxacin-dexamethasone (CiproDEX) otic suspension 4 drops, otic (ear), 2 times daily   • diazePAM (Diastat Acudial) 5-7.5-10 mg rectal kit 10 mg, rectal, As needed, Prior to administration, review instruction sheet supplied with dose unit. Verify the ordered dose is set for administration.   • fluticasone (Flonase) 50 mcg/actuation nasal spray Administer into affected nostril(s).   • glycerin (Fleet Glycerin, Adult,) 2 g suppository 1 suppository, Once as needed   • omeprazole (PriLOSEC) 20 mg DR capsule 1 capsule, Daily   • OXcarbazepine (Trileptal) 300 mg tablet Take 2 tablets twice a day for a total of 600mg twice a day.   • OXcarbazepine (Trileptal) 300 mg tablet Take 2 tablets twice a day for a total of 600mg bid.   • polyethylene glycol (GLYCOLAX, MIRALAX) 17 g, Daily RT      Allergies:  Citrus and derivatives, Ciprofloxacin, Ciprofloxacin-dexamethasone, Cocoa, Baclofen, and Sulfamethoxazole-trimethoprim    Review of Systems:   A comprehensive 10-point review of systems was obtained including constitutional, neurological, HEENT, pulmonary, cardiovascular, genito-urinary, and other pertinent systems and was negative except as noted in the HPI.     Objective  Physical Exam:  Last Recorded Vitals: Blood pressure 123/85, pulse 80, temperature 36.8 °C (98.2 °F).    On physical exam, the patient is a well-nourished, well-developed patient, in no acute distress. Head and face is atraumatic and  normocephalic. Salivary glands are intact. Facial strength is symmetrical bilaterally.       On ear examination:  Right ear: The patient has old wet epithelial debris that was cleared with suction. The tympanic membrane is intact with mild retraction of the pars tensa.  Left ear: The patient has old moist epithelial debris that was cleared with suction. There is a small amount of granulation on the medial posterior bony ear canal. The tympanic membrane is intact with moderate retraction of the pars tensa and likely middle ear effusion.  Tuning fork exam was not performed.    On neuro exam, the patient is alert and oriented x3, cranial nerves are grossly intact.      The rest of the exam, including anterior rhinoscopy, oropharyngeal exam, neck exam, and cardiovascular exam, were normal including no palpable lymphadenopathies, thyroid in the midline position, normal pulses, and normal chest excursion.       Reviewed Results:    Imaging:  None for review     Procedure:  Ear Cleaning   Procedure: Ear Cleaning Due to Otorrhea   Indication: Debris in Ear Canal   Location: Both Ears  Prep: Nothing was placed in the ear canal(s) prior to the procedure.   Preformed by: The procedure was performed by the Provider.  Visualization Instrument: A Microscope and Speculum were placed in the ear canal(s) to visualize the ear canal debris.   Ear cleaning Instruments: Suction were used during the procedure.   Outcome: The procedure was successful.   Patient Status: The patient tolerated the procedure well.   Complications: There were no complications.     Assessment/Plan    1. Other infective chronic otitis externa of both ears    2. Bilateral chronic serous otitis media    3. Bilateral impacted cerumen        In summary, Paul John is a 35 y.o. male with a history of CP and developmental delay as well as cleft palate/lip and chronic otitis media. He was previously followed by Dr. Lozano before he retired. He has had PE tubes  in place for almost his whole life, though they were removed at his last visit in 10/2022 due to chronic otorrhea from retained tubes for > 30 years. He was lost to follow-up thereafter and returns today following recent ear infection.     He has evidence of resolving otitis externa bilaterally, as well as tympanic membrane retraction and an effusion on the left. We will have him use tobradex drops in both ears for 2 weeks. Will schedule for ear tube replacement to help ventilate the ears given underlying cleft palate and bilateral TM retraction/effusion noted on exam today.     This procedure will be scheduled in the near future.         ____________________________________________________  Javier Valera MD  Professor and Chief   Otology/Neurotology/Lateral Skull-Base Surgery   [facility]

## 2025-06-11 NOTE — PROGRESS NOTES
Reason for Consult:  ear check follow up visit     Subjective   History Of Present Illness:  Paul John is a 35 y.o. male with a history of CP and developmental delay as well as cleft palate/lip and chronic otitis media. He was previously followed by Dr. Lozano before he retired. He has had PE tubes in place for almost his whole life, though they were removed at his last visit in 10/2022 due to chronic otorrhea from tubes retained > 30 years. He was not seen by me after that and did well until mid-May when he started picking more at his left ear as though it was hurting. He was seen by his PA and eventually Kamila Omer PA-C. He has been on amoxicillin, Augmentin, and ciprofloxacin drops with questionable improvement. Mom does not think he has had significant subjective changes in his hearing. No known recent URIs or water exposure to the ears.     Past Medical History:  He has a past medical history of Epilepsy, unspecified, not intractable, without status epilepticus (05/31/2018), Personal history of (corrected) cleft lip and palate (05/31/2018), Personal history of other diseases of the digestive system, and Personal history of other diseases of the nervous system and sense organs (05/31/2018).    Surgical History:  He has a past surgical history that includes Gastrostomy tube placement (05/31/2018); Other surgical history (03/12/2019); Other surgical history (03/12/2019); and Other surgical history (03/12/2019).     Social History:  He reports that he has never smoked. He has never been exposed to tobacco smoke. He has never used smokeless tobacco. No history on file for alcohol use and drug use.    Family History:  family history includes Cleft palate in his mother's brother and mother's sister; Hypertension in his mother.     Medications:  Current Outpatient Medications   Medication Instructions    ciprofloxacin-dexamethasone (CiproDEX) otic suspension 4 drops, otic (ear), 2 times daily     diazePAM (Diastat Acudial) 5-7.5-10 mg rectal kit 10 mg, rectal, As needed, Prior to administration, review instruction sheet supplied with dose unit. Verify the ordered dose is set for administration.    fluticasone (Flonase) 50 mcg/actuation nasal spray Administer into affected nostril(s).    glycerin (Fleet Glycerin, Adult,) 2 g suppository 1 suppository, Once as needed    omeprazole (PriLOSEC) 20 mg DR capsule 1 capsule, Daily    OXcarbazepine (Trileptal) 300 mg tablet Take 2 tablets twice a day for a total of 600mg twice a day.    OXcarbazepine (Trileptal) 300 mg tablet Take 2 tablets twice a day for a total of 600mg bid.    polyethylene glycol (GLYCOLAX, MIRALAX) 17 g, Daily RT      Allergies:  Citrus and derivatives, Ciprofloxacin, Ciprofloxacin-dexamethasone, Cocoa, Baclofen, and Sulfamethoxazole-trimethoprim    Review of Systems:   A comprehensive 10-point review of systems was obtained including constitutional, neurological, HEENT, pulmonary, cardiovascular, genito-urinary, and other pertinent systems and was negative except as noted in the HPI.     Objective   Physical Exam:  Last Recorded Vitals: Blood pressure 123/85, pulse 80, temperature 36.8 °C (98.2 °F).    On physical exam, the patient is a well-nourished, well-developed patient, in no acute distress. Head and face is atraumatic and normocephalic. Salivary glands are intact. Facial strength is symmetrical bilaterally.       On ear examination:  Right ear: The patient has old wet epithelial debris that was cleared with suction. The tympanic membrane is intact with mild retraction of the pars tensa.  Left ear: The patient has old moist epithelial debris that was cleared with suction. There is a small amount of granulation on the medial posterior bony ear canal. The tympanic membrane is intact with moderate retraction of the pars tensa and likely middle ear effusion.  Tuning fork exam was not performed.    On neuro exam, the patient is alert and  oriented x3, cranial nerves are grossly intact.      The rest of the exam, including anterior rhinoscopy, oropharyngeal exam, neck exam, and cardiovascular exam, were normal including no palpable lymphadenopathies, thyroid in the midline position, normal pulses, and normal chest excursion.       Reviewed Results:    Imaging:  None for review     Procedure:  Ear Cleaning   Procedure: Ear Cleaning Due to Otorrhea   Indication: Debris in Ear Canal   Location: Both Ears  Prep: Nothing was placed in the ear canal(s) prior to the procedure.   Preformed by: The procedure was performed by the Provider.  Visualization Instrument: A Microscope and Speculum were placed in the ear canal(s) to visualize the ear canal debris.   Ear cleaning Instruments: Suction were used during the procedure.   Outcome: The procedure was successful.   Patient Status: The patient tolerated the procedure well.   Complications: There were no complications.     Assessment/Plan     1. Other infective chronic otitis externa of both ears    2. Bilateral chronic serous otitis media    3. Bilateral impacted cerumen        In summary, Paul John is a 35 y.o. male with a history of CP and developmental delay as well as cleft palate/lip and chronic otitis media. He was previously followed by Dr. Lozano before he retired. He has had PE tubes in place for almost his whole life, though they were removed at his last visit in 10/2022 due to chronic otorrhea from retained tubes for > 30 years. He was lost to follow-up thereafter and returns today following recent ear infection.     He has evidence of resolving otitis externa bilaterally, as well as tympanic membrane retraction and an effusion on the left. We will have him use tobradex drops in both ears for 2 weeks. Will schedule for ear tube replacement to help ventilate the ears given underlying cleft palate and bilateral TM retraction/effusion noted on exam today.     This procedure will be scheduled  in the near future.         ____________________________________________________  Javier Valera MD  Professor and Chief   Otology/Neurotology/Lateral Skull-Base Surgery   [facility]

## 2025-06-13 ENCOUNTER — HOSPITAL ENCOUNTER (OUTPATIENT)
Facility: HOSPITAL | Age: 35
Setting detail: OUTPATIENT SURGERY
End: 2025-06-13
Attending: OTOLARYNGOLOGY | Admitting: OTOLARYNGOLOGY
Payer: MEDICAID

## 2025-06-20 ENCOUNTER — CLINICAL SUPPORT (OUTPATIENT)
Dept: PREADMISSION TESTING | Facility: HOSPITAL | Age: 35
End: 2025-06-20
Payer: MEDICAID

## 2025-06-20 NOTE — CPM/PAT NURSE NOTE
CPM/PAT Nurse Note      Name: Paul John (Paul FRANKEL Alvaro)  /Age: 1990/35 y.o.       Medical History[1]    Surgical History[2]    Patient Sexual activity questions deferred to the physician.    Family History[3]    Allergies[4]    Prior to Admission medications    Medication Sig Start Date End Date Taking? Authorizing Provider   ciprofloxacin-dexamethasone (CiproDEX) otic suspension Administer 4 drops into affected ear(s) 2 times a day for 7 days. 25  Kamila Omer PA-C   diazePAM (Diastat Acudial) 5-7.5-10 mg rectal kit Insert 10 mg into the rectum if needed for seizures. Prior to administration, review instruction sheet supplied with dose unit. Verify the ordered dose is set for administration. 24  CAROLINA Hernandez   fluticasone (Flonase) 50 mcg/actuation nasal spray Administer into affected nostril(s).    Historical Provider, MD   glycerin (Fleet Glycerin, Adult,) 2 g suppository Insert 1 suppository into the rectum 1 time if needed for constipation. 14   Historical Provider, MD   omeprazole (PriLOSEC) 20 mg DR capsule Take 1 capsule (20 mg) by mouth once daily. 12/15/14   Historical Provider, MD   OXcarbazepine (Trileptal) 300 mg tablet Take 2 tablets twice a day for a total of 600mg twice a day. 24   CAROLINA Hernandez   OXcarbazepine (Trileptal) 300 mg tablet Take 2 tablets twice a day for a total of 600mg bid. 24   CAROLINA Hernandez   polyethylene glycol (Glycolax, Miralax) 17 gram/dose powder Mix 17 g of powder and drink once daily. 21   Historical Provider, MD   tobramycin-dexamethasone (Tobradex) ophthalmic suspension Administer 3 drops into each ear every 4 hours while awake for 14 days. 25  Gracy Aguillon MD        PAT ROS     DASI Risk Score    No data to display       Caprini DVT Assessment    No data to display       Modified Frailty Index    No data to display       [id number] Stroke  Risk Points  Current as of just now        N/A 0 to 9 Points:      Last Change: N/A          The LLX8HV1-EWQw risk score (Pritesh HART, et al. 2009. © 2010 [employer]) quantifies the risk of stroke for a patient with atrial fibrillation. For patients without atrial fibrillation or under the age of 18 this score appears as N/A. Higher score values generally indicate higher risk of stroke.        This score is not applicable to this patient. Components are not calculated.          Revised Cardiac Risk Index    No data to display       Apfel Simplified Score    No data to display       Risk Analysis Index Results This Encounter    No data found in the last 10 encounters.       Prodigy: High Risk  Total Score: 8              Prodigy Gender Score          ARISCAT Score for Postoperative Pulmonary Complications    No data to display       Kristy Perioperative Risk for Myocardial Infarction or Cardiac Arrest (BREE)    No data to display         Nurse Plan of Action:   RN screening call complete.  Reviewed allergies, medications and pharmacy, medical, surgical and social history with patient.  Chart updated.               [1]   Past Medical History:  Diagnosis Date    Chronic otitis media     Delayed emergence from general anesthesia     Epilepsy, unspecified, not intractable, without status epilepticus 05/31/2018    Seizure disorder    Partial anomalous pulmonary venous return ([facility]HS-HCC)     repaired as infant    Personal history of (corrected) cleft lip and palate 05/31/2018    History of cleft palate, mechanical soft diet, feed    Personal history of other diseases of the digestive system     History of gastroesophageal reflux (GERD)    Personal history of other diseases of the nervous system and sense organs 05/31/2018    History of cerebral palsy    Scoliosis     Vitamin D deficiency    [2]   Past Surgical History:  Procedure Laterality Date    GASTROSTOMY TUBE PLACEMENT  05/31/2018    Percutaneous  Placement Of Gastrostomy Tube/ removed as child    OTHER SURGICAL HISTORY  03/12/2019    Cleft palate repair    OTHER SURGICAL HISTORY  03/12/2019    Nasal polypectomy    OTHER SURGICAL HISTORY  03/12/2019    Ear pressure equalization tube insertion    UPPER GASTROINTESTINAL ENDOSCOPY     [3]   Family History  Problem Relation Name Age of Onset    Hypertension Mother      Hypertension Father      Hypertension Brother      Cleft palate Mother's Sister      Cleft palate Mother's Brother     [4]   Allergies  Allergen Reactions    Citrus And Derivatives Anaphylaxis    Ciprofloxacin Unknown    Ciprofloxacin-Dexamethasone Unknown    Valmora Other    Baclofen Rash    Sulfamethoxazole-Trimethoprim Hives

## 2025-06-23 ENCOUNTER — PRE-ADMISSION TESTING (OUTPATIENT)
Dept: PREADMISSION TESTING | Facility: HOSPITAL | Age: 35
End: 2025-06-23
Payer: MEDICAID

## 2025-06-23 ENCOUNTER — LAB (OUTPATIENT)
Dept: LAB | Facility: HOSPITAL | Age: 35
End: 2025-06-23
Payer: MEDICAID

## 2025-06-23 VITALS
RESPIRATION RATE: 14 BRPM | WEIGHT: 88.85 LBS | HEART RATE: 83 BPM | SYSTOLIC BLOOD PRESSURE: 138 MMHG | TEMPERATURE: 98.8 F | HEIGHT: 62 IN | OXYGEN SATURATION: 98 % | BODY MASS INDEX: 16.35 KG/M2 | DIASTOLIC BLOOD PRESSURE: 84 MMHG

## 2025-06-23 DIAGNOSIS — Z01.818 ENCOUNTER FOR OTHER PREPROCEDURAL EXAMINATION: ICD-10-CM

## 2025-06-23 DIAGNOSIS — Z01.818 PREOP TESTING: Primary | ICD-10-CM

## 2025-06-23 LAB
ANION GAP SERPL CALC-SCNC: 12 MMOL/L (ref 10–20)
BASOPHILS # BLD AUTO: 0.03 X10*3/UL (ref 0–0.1)
BASOPHILS NFR BLD AUTO: 0.4 %
BUN SERPL-MCNC: 8 MG/DL (ref 6–23)
CALCIUM SERPL-MCNC: 9.2 MG/DL (ref 8.6–10.3)
CHLORIDE SERPL-SCNC: 101 MMOL/L (ref 98–107)
CO2 SERPL-SCNC: 25 MMOL/L (ref 21–32)
CREAT SERPL-MCNC: 0.65 MG/DL (ref 0.5–1.3)
EGFRCR SERPLBLD CKD-EPI 2021: >90 ML/MIN/1.73M*2
EOSINOPHIL # BLD AUTO: 0.02 X10*3/UL (ref 0–0.7)
EOSINOPHIL NFR BLD AUTO: 0.3 %
ERYTHROCYTE [DISTWIDTH] IN BLOOD BY AUTOMATED COUNT: 11.8 % (ref 11.5–14.5)
GLUCOSE SERPL-MCNC: 95 MG/DL (ref 74–99)
HCT VFR BLD AUTO: 46.9 % (ref 41–52)
HGB BLD-MCNC: 15.2 G/DL (ref 13.5–17.5)
IMM GRANULOCYTES # BLD AUTO: 0.02 X10*3/UL (ref 0–0.7)
IMM GRANULOCYTES NFR BLD AUTO: 0.3 % (ref 0–0.9)
LYMPHOCYTES # BLD AUTO: 1.18 X10*3/UL (ref 1.2–4.8)
LYMPHOCYTES NFR BLD AUTO: 16.6 %
MCH RBC QN AUTO: 30.7 PG (ref 26–34)
MCHC RBC AUTO-ENTMCNC: 32.4 G/DL (ref 32–36)
MCV RBC AUTO: 95 FL (ref 80–100)
MONOCYTES # BLD AUTO: 0.66 X10*3/UL (ref 0.1–1)
MONOCYTES NFR BLD AUTO: 9.3 %
NEUTROPHILS # BLD AUTO: 5.21 X10*3/UL (ref 1.2–7.7)
NEUTROPHILS NFR BLD AUTO: 73.1 %
NRBC BLD-RTO: 0 /100 WBCS (ref 0–0)
PLATELET # BLD AUTO: 349 X10*3/UL (ref 150–450)
POTASSIUM SERPL-SCNC: 4 MMOL/L (ref 3.5–5.3)
RBC # BLD AUTO: 4.95 X10*6/UL (ref 4.5–5.9)
SODIUM SERPL-SCNC: 134 MMOL/L (ref 136–145)
WBC # BLD AUTO: 7.1 X10*3/UL (ref 4.4–11.3)

## 2025-06-23 PROCEDURE — 87081 CULTURE SCREEN ONLY: CPT | Mod: AHULAB

## 2025-06-23 PROCEDURE — 80048 BASIC METABOLIC PNL TOTAL CA: CPT

## 2025-06-23 PROCEDURE — 85025 COMPLETE CBC W/AUTO DIFF WBC: CPT

## 2025-06-23 RX ORDER — CHLORHEXIDINE GLUCONATE ORAL RINSE 1.2 MG/ML
15 SOLUTION DENTAL AS NEEDED
Qty: 473 ML | Refills: 0 | Status: SHIPPED | OUTPATIENT
Start: 2025-06-23 | End: 2025-06-25

## 2025-06-23 ASSESSMENT — ENCOUNTER SYMPTOMS
NECK NEGATIVE: 1
CARDIOVASCULAR NEGATIVE: 1
RESPIRATORY NEGATIVE: 1
CONSTIPATION: 1
CONSTITUTIONAL NEGATIVE: 1
EYES NEGATIVE: 1
ENDOCRINE NEGATIVE: 1
MUSCULOSKELETAL NEGATIVE: 1

## 2025-06-23 NOTE — PREPROCEDURE INSTRUCTIONS
Medication List            Accurate as of June 23, 2025 12:44 PM. Always use your most recent med list.                chlorhexidine 0.12 % solution  Commonly known as: Peridex  Use 15 mL in the mouth or throat if needed for wound care (swish and spit 15 mL for 30 seconds night prior to surgery and morning of surgery) for up to 2 days.  Medication Adjustments for Surgery: Take/Use as prescribed     diazePAM 5-7.5-10 mg rectal kit  Commonly known as: Diastat Acudial  Insert 10 mg into the rectum if needed for seizures. Prior to administration, review instruction sheet supplied with dose unit. Verify the ordered dose is set for administration.  Medication Adjustments for Surgery: Take/Use as prescribed     Fleet Glycerin (Adult) 2 g suppository  Generic drug: glycerin  Medication Adjustments for Surgery: Do Not take on the morning of surgery     fluticasone 50 mcg/actuation nasal spray  Commonly known as: Flonase  Medication Adjustments for Surgery: Take/Use as prescribed     omeprazole 20 mg DR capsule  Commonly known as: PriLOSEC  Medication Adjustments for Surgery: Take/Use as prescribed     * OXcarbazepine 300 mg tablet  Commonly known as: Trileptal  Take 2 tablets twice a day for a total of 600mg bid.  Medication Adjustments for Surgery: Take/Use as prescribed     * OXcarbazepine 300 mg tablet  Commonly known as: Trileptal  Take 2 tablets twice a day for a total of 600mg twice a day.  Medication Adjustments for Surgery: Take/Use as prescribed     polyethylene glycol 17 gram/dose powder  Commonly known as: Glycolax, Miralax  Medication Adjustments for Surgery: Do Not take on the morning of surgery     tobramycin-dexamethasone ophthalmic suspension  Commonly known as: Tobradex  Administer 3 drops into each ear every 4 hours while awake for 14 days.  Medication Adjustments for Surgery: Take/Use as prescribed           * This list has 2 medication(s) that are the same as other medications prescribed for you. Read  the directions carefully, and ask your doctor or other care provider to review them with you.                         Preoperative Deep Breathing Exercises  Why it is important to do deep breathing exercises before my surgery?  Deep breathing exercises strengthen your breathing muscles.  This helps you to recover after your surgery and decreases the chance of breathing complications.  How are the deep breathing exercises done?  Sit straight with your back supported.  Breathe in deeply and slowly through your nose. Your lower rib cage should expand and your abdomen may move forward.  Hold that breath for 3 to 5 seconds.  Breathe out through pursed lips, slowly and completely.  Rest and repeat 10 times every hour while awake.  Rest longer if you become dizzy or lightheaded.        CONTACT SURGEON'S OFFICE IF YOU DEVELOP:  * Fever = 100.4 F   * New respiratory symptoms (e.g. cough, shortness of breath, respiratory distress, sore throat)  * Recent loss of taste or smell  *Flu like symptoms such as headache, fatigue or gastrointestinal symptoms  * You develop any open sores, shingles, burning or painful urination   AND/OR:  * You no longer wish to have the surgery.  * Any other personal circumstances change that may lead to the need to cancel or defer this surgery.  *You were admitted to any hospital within one week of your planned procedure.    SMOKING:  *Quitting smoking can make a huge difference to your health and recovery from surgery.    *If you need help with quitting, call 9-579-QUIT-NOW.    THE DAY OF SURGERY:  *Do not eat any food after midnight the night before your surgery.   *YOU MUST drink 14 OUNCES of clear liquids TWO hours before your instructed ARRIVAL TIME to the hospital. This includes water, black tea/coffee (no milk or cream), apple juice, clear broth and electrolyte drinks (Gatorade).  Please avoid clear liquids that are red in color.   *You may chew gum/mints up to TWO hours before your  surgery/procedure.    SURGICAL TIME:  *You will be contacted between 2 p.m. and 6 p.m. the business day before your surgery with your arrival time.  *If you haven't received a call by 6pm, call [phone].  *Scheduled surgery times may change and you will be notified if this occurs-check your personal voicemail for any updates.    ON THE MORNING OF SURGERY:  *Wear comfortable, loose fitting clothing.   *Do not use moisturizers, creams, lotions or perfume.  *All jewelry and valuables should be left at home.  *Prosthetic devices such as contact lenses, hearing aids, dentures, eyelash extensions, hairpins and body piercing must be removed before surgery.    BRING WITH YOU:  *Photo ID and insurance card  *Current list of medications and allergies  *Pacemaker/Defibrillator/Heart stent cards  *CPAP machine and mask  *Slings/splints/crutches  *Copy of your complete Advanced Directive/DHPOA-if applicable  *Neurostimulator implant remote    PARKING AND ARRIVAL:  *Check in at the Main Entrance desk and let them know you are here for surgery.  *You will be directed to the 2nd floor surgical waiting area.    IF YOU ARE HAVING OUTPATIENT/SAME DAY SURGERY:  *A responsible adult MUST accompany you at the time of discharge and stay with you for 24 hours after your surgery.  *You may NOT drive yourself home after surgery.  *You may use a taxi or ride sharing service ([employer], Uber) to return home ONLY if you are accompanied by a friend or family member.  *Instructions for resuming your medications will be provided by your surgeon.      Home Preoperative Antibacterial Shower     What is a home preoperative antibacterial shower?  This shower is a way of cleaning the skin with a germ killing soap before surgery.  The soap contains chlorhexidine, commonly known as CHG.  CHG is a soap for your skin with germ killing ability.  Let your doctor know if you are allergic to chlorhexidine.    Why do I need to take a preoperative antibacterial  shower?  Skin is not sterile.  It is best to try to make your skin as free of germs as possible before surgery.  Proper cleansing with a germ killing soap before surgery can lower the number of germs on your skin.  This helps to reduce the risk of infection at the surgical site.  Following the instructions listed below will help you prepare your skin for surgery.      How do I use the CHG skin cleanser?  Steps:  Begin using your CHG soap five days before your scheduled surgery on ________________________.    Days 1-4 Shower before bed:  Wash your face and genitals with your normal soap and rinse.           2.    Apply the CHG soap to a clean wet washcloth.  Turn the water off or move away                From the water spray to avoid premature rinsing of the CHG soap as you are applying.     3.   Lather your entire body from the neck down.  Do not use on your face or genitals.  4. Pay special attention to the area(s) where your incision(s) will be located unless they are on your face.  Avoid scrubbing your skin too hard.  The important point is to have the CHG soap sit on your skin for 3 minutes.    When the 3 minutes are up, turn on the water and rinse the CHG soap off your body completely.   Pat yourself dry with a clean, freshly-laundered towel.  Dress in clean, freshly laundered night clothes.    Be sure to change bed sheets and blankets at least on the first night of CHG body wash use. May change linens every night of the above protocol for maximum benefit.   Day 5:  Last shower is the morning of surgery: Follow above Instructions.    NOTE:        *Keep CHG soap out of eyes and ear canals   *DO NOT wash with regular soap on your body after you have used the CHG soap solution  *DO NOT apply powders, lotions, or perfume.  *Deodorant may be used days 1-4, BUT NOT the day of surgery    Who should I contact if I have any questions regarding the use of CHG soap?  Call the [facility],  Preadmission Testing at [phone] if you have any questions.              Patient Information: Pre-Operative Infection Prevention Measures     Why did I have my nose, under my arms and groin swabbed?  The purpose of the swab is to identify Staphylococcus aureus inside your nose or on your skin.  The swab was sent to the laboratory for culture.  A positive swab/culture for Staphylococcus aureus is called colonization or carriage.      What is Staphylococcus aureus?  Staphylococcus aureus, also known as “staph”, is a germ found on the skin or in the nose of healthy people.  Sometimes Staphylococcus aureus can get into the body and cause an infection.  This can be minor (such as pimples, boils or other skin problems).  It might also be serious (such as blood infection, pneumonia or a surgical site infection).    What is Staphylococcus aureus colonization or carriage?  Colonization or carriage means that a person has the germ but is not sick from it.  These bacteria can be spread on the hands or when breathing or sneezing.    How is Staphylococcus aureus spread?  It is most often spread by close contact with a person or item that carries it.    What happens if my culture is positive for Staphylococcus aureus?  Your doctor/medical team will use this information to guide any antibiotic treatment which may be necessary.  Regardless of the culture results, we will clean the inside of your nose with a betadine swab just before you have your surgery.      Will I get an infection if I have Staphylococcus aureus in my nose or on my skin?  Anyone can get an infection with Staphylococcus aureus.  However, the best way to reduce your risk of infection is to follow the instructions provided to you for the use of your CHG soap and dental rinse.        Who should I contact if I have any questions?  Call the [facility], Preadmission Testing at [phone] if you have any questions.          Patient  Information: Oral/Dental Rinse  **This is a prescription; pick it up at your preferred local pharmacy **  What is oral/dental rinse?   It is a mouthwash. It is a way of cleaning the mouth with a germ killing solution before your surgery.  The solution contains chlorhexidine, commonly known as CHG.   It is used inside the mouth to kill a bacteria known as Staphylococcus aureus.  Let your doctor know if you are allergic to Chlorhexidine.    Why do I need to use CHG oral/dental rinse?  The CHG oral/dental rinse helps to kill a bacteria in your mouth known a Staphylococcus aureus.     This reduces the risk of infection at the surgical site.      Using your CHG oral/dental rinse  STEPS:  Use your CHG oral/dental rinse after you brush your teeth the night before (at bedtime) and the morning of your surgery.  Follow all directions on your prescription label.    Use the cap on the container to measure 15ml (fill cap to fill line)  Swish (gargle if you can) the mouthwash in your mouth for at least 30 seconds, (do not to swallow) spit out  After you use your CHG rinse, do not rinse your mouth with water, drink or eat.  Please refer to prescription label for the appropriate time to resume oral intake  Dental rinse comes in one size bottle: 473ml ~16oz.  You will have leftover    rinse, discard after this use.    What side effects might I have using the CHG oral/dental rinse?  CHG rinse will stick to plaque on the teeth.  Brush and floss just before use.  Teeth brushing will help avoid staining of plaque during use.    Who should I contact if I have questions about the CHG oral/dental rinse?  Please call [facility], Preadmission Testing at [phone] if you have any questions

## 2025-06-23 NOTE — CPM/PAT H&P
[facility] Evaluation       Name: Paul MARLYS John (Paul John)  /Age: 1990/35 y.o.     In-Person       Chief Complaint: recurrent ear infection    HPI      Date of Consult: 25    Referring Provider:  Dr. Javier Randhawa    Date, Surgery, and Length:  25, Myringotomy; Tympanostomy; Bilateral Tube Insertion, 75 minutes      Patient presents to [facility] for perioperative risk assessment prior to scheduled surgery. Pt with a h/o CP and developmental delay with chronic OM. Pt did have PE tubes in until 10/2022 when they were removed. Pt has been picking more at his left ear. Pt has been on amoxicillin, augmentin, and cipro drops without improvement.         This note was created in part upon personal review of patient's medical records.    Pt has experienced delayed emergence from anesthesia and PONV.    Pt denies any past history of anesthetic complications such as, awareness, dental damage, aspiration, cardiac arrest, difficult intubation, difficult I.V. access or unexpected hospital admissions. No history of malignant hyperthermia and or pseudocholinesterase deficiency.    History of blood transfusion at birth.    The patient IS NOT a [employer] and will accept blood and blood products if medically indicated.     Type and screen WAS NOT sent.    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 as infant    Personal history of (corrected) cleft lip and palate 2018    History of cleft palate, mechanical soft diet, feed    Personal history of other diseases of the digestive system     History of gastroesophageal reflux (GERD)    Personal history of other diseases of the nervous system and sense organs 2018    History of cerebral palsy     Scoliosis     Vitamin D deficiency        Past Surgical History:   Procedure Laterality Date    GASTROSTOMY TUBE PLACEMENT  05/31/2018    Percutaneous Placement Of Gastrostomy Tube/ removed as child    OTHER SURGICAL HISTORY  03/12/2019    Cleft palate repair    OTHER SURGICAL HISTORY  03/12/2019    Nasal polypectomy    OTHER SURGICAL HISTORY  03/12/2019    Ear pressure equalization tube insertion    UPPER GASTROINTESTINAL ENDOSCOPY         Family History   Problem Relation Name Age of Onset    Hypertension Mother      Hypertension Father      Hypertension Brother      Cleft palate Mother's Sister      Cleft palate Mother's Brother       Social History     Tobacco Use   Smoking Status Never    Passive exposure: Never   Smokeless Tobacco Never     Social History     Substance and Sexual Activity   Alcohol Use Never     Social History     Substance and Sexual Activity   Drug Use Never       Allergies   Allergen Reactions    Citrus And Derivatives Anaphylaxis    Ciprofloxacin Unknown    Ciprofloxacin-Dexamethasone Unknown    [place] Other    Baclofen Rash    Sulfamethoxazole-Trimethoprim Hives       Current Outpatient Medications   Medication Instructions    chlorhexidine (Peridex) 0.12 % solution 15 mL, Mouth/Throat, As needed    diazePAM (Diastat Acudial) 5-7.5-10 mg rectal kit 10 mg, rectal, As needed, Prior to administration, review instruction sheet supplied with dose unit. Verify the ordered dose is set for administration.    fluticasone (Flonase) 50 mcg/actuation nasal spray Administer into affected nostril(s).    glycerin (Fleet Glycerin, Adult,) 2 g suppository 1 suppository, Once as needed    omeprazole (PriLOSEC) 20 mg DR capsule 1 capsule, Daily    OXcarbazepine (Trileptal) 300 mg tablet Take 2 tablets twice a day for a total of 600mg twice a day.    OXcarbazepine (Trileptal) 300 mg tablet Take 2 tablets twice a day for a total of 600mg bid.    polyethylene glycol (GLYCOLAX, MIRALAX) 17 g, Daily RT     "tobramycin-dexamethasone (Tobradex) ophthalmic suspension 3 drops, Each Ear, Every 4 hours while awake          PAT ROS:   Constitutional:   neg    Neuro/Psych:   Eyes:   neg    Ears:    ear pain  Nose:   neg    Mouth:   neg    Throat:   neg    Neck:   neg    Cardio:   neg    Respiratory:   neg    Endocrine:   neg    GI:    constipation  :   neg    Musculoskeletal:   neg    Hematologic:   neg    Skin:  neg        Physical Exam  Constitutional:       Comments: Thin, in chair, interacting with dad   Cardiovascular:      Rate and Rhythm: Normal rate and regular rhythm.      Heart sounds: No murmur heard.     No friction rub. No gallop.   Pulmonary:      Effort: Pulmonary effort is normal.      Breath sounds: Normal breath sounds. No wheezing, rhonchi or rales.   Musculoskeletal:      Cervical back: Normal range of motion.          PAT AIRWAY:   Airway:     Mallampati::  Unable to assess    Neck ROM::  Full   Cleft palate, poor dentition          Visit Vitals  /84   Pulse 83   Temp 37.1 °C (98.8 °F) (Temporal)   Resp 14   Ht 1.58 m (5' 2.21\")   Wt (!) 40.3 kg (88 lb 13.5 oz)   SpO2 98%   BMI 16.14 kg/m²   Smoking Status Never   BSA 1.33 m²           Patient is a 35 y.o.  male scheduled for Myringotomy; Tympanostomy; Bilateral Tube Insertion with Dr. Soto Randhawa on 6/26/25.      Plan      Neuro:  No neurologic diagnosis, however, the patient is at increased risk for perioperative delirium secondary to  sensory impairment, decreased functional status, polypharmacy  Patient is not at increased risk for perioperative CVA      Epilepsy- follows with Neuro. Last seizure was last night. Induced from constipation, pain, being scared. On Trileptal. Experiencing a seizure once a week or once every other week- baseline. Neuro LOV 11/2024.    Cardiovascular:    RCRI: 0 Risk of Mace: 3.9%    Caprini: 2    Patient denies any chest pain, tightness, heaviness, pressure, radiating pain, palpitations, irregular " heartbeats, lightheadedness, cough, congestion, shortness of breath, BREWER, PND, near syncope, weight loss or gain.    Cannot assess functional capacity (CP, developmentally delayed, in chair)      EKG in PAT not indicated.            Pulmonary:  No pulmonary diagnosis or significant findings on chart review or clinical presentation.  No further preoperative testing is indicated at this time.  Stop Bang score is 2 placing patient at low risk for KERRY  ARISCAT: <26 points, 1.6% risk of in-hospital postoperative pulmonary complication  PRODIGY: Moderate risk for opioid induced respiratory depression  Pumonary toilet education discussed, patient also provided deep breathing exercises and incentive spirometry educational handout        GI/:  GERD- controlled on Omeprazole (cont through periop period).    Constipation- uses fleet enema, mirilax (hold DOS).      Heme:  Patient instructed to ambulate as soon as possible postoperatively to decrease thromboembolic risk.    Initiate mechanical DVT prophylaxis as soon as possible and initiate chemical prophylaxis when deemed safe from a bleeding standpoint post surgery.              Risk assessment complete.  This patient is LOW risk candidate undergoing LOW risk procedure, patient is medically optimized for surgery.        Labs/testing obtained in PAT on 6/23/25: CBC, BMP, MRSA    Lab Results   Component Value Date    WBC 7.1 06/23/2025    HGB 15.2 06/23/2025    HCT 46.9 06/23/2025    MCV 95 06/23/2025     06/23/2025     Lab Results   Component Value Date    GLUCOSE 95 06/23/2025    CALCIUM 9.2 06/23/2025     (L) 06/23/2025    K 4.0 06/23/2025    CO2 25 06/23/2025     06/23/2025    BUN 8 06/23/2025    CREATININE 0.65 06/23/2025       Follow up/communication: neuro recs received:          Preoperative medication instructions were provided and reviewed with the patient.  Any additional testing or evaluation was explained to the patient.  Nothing by mouth  instructions were discussed and patient's questions were answered prior to conclusion to this encounter.  Patient verbalized understanding of preoperative instructions given in preadmission testing; discharge instructions available in EMR.    This note was dictated with speech recognition.  Minor errors may have been detected during use of speech recognition.      Charge not submitted as CPM/PAT visit within 72 hours of scheduled surgery.

## 2025-06-23 NOTE — CPM/PAT NURSE NOTE
CPM/PAT Nurse Note      Name: Paul John (Paul L Alvaro)  /Age: 1990/35 y.o.       Medical History[1]    Surgical History[2]    Patient Sexual activity questions deferred to the physician.    Family History[3]    Allergies[4]    Prior to Admission medications    Medication Sig Start Date End Date Taking? Authorizing Provider   glycerin (Fleet Glycerin, Adult,) 2 g suppository Insert 1 suppository into the rectum 1 time if needed for constipation. 14  Yes Historical Provider, MD   omeprazole (PriLOSEC) 20 mg DR capsule Take 1 capsule (20 mg) by mouth once daily. 12/15/14  Yes Historical Provider, MD   OXcarbazepine (Trileptal) 300 mg tablet Take 2 tablets twice a day for a total of 600mg twice a day. 24  Yes CAROLINA Hernandez   polyethylene glycol (Glycolax, Miralax) 17 gram/dose powder Mix 17 g of powder and drink once daily. 21  Yes Historical Provider, MD   tobramycin-dexamethasone (Tobradex) ophthalmic suspension Administer 3 drops into each ear every 4 hours while awake for 14 days. 25 Yes Gracy Aguillon MD   chlorhexidine (Peridex) 0.12 % solution Use 15 mL in the mouth or throat if needed for wound care (swish and spit 15 mL for 30 seconds night prior to surgery and morning of surgery) for up to 2 days. 25  CAROLINA Avalos   ciprofloxacin-dexamethasone (CiproDEX) otic suspension Administer 4 drops into affected ear(s) 2 times a day for 7 days. 25  Kamila Omer PA-C   diazePAM (Diastat Acudial) 5-7.5-10 mg rectal kit Insert 10 mg into the rectum if needed for seizures. Prior to administration, review instruction sheet supplied with dose unit. Verify the ordered dose is set for administration. 24  CAROLINA Hernandez   fluticasone (Flonase) 50 mcg/actuation nasal spray Administer into affected nostril(s).  Patient not taking: Reported on 2025    Historical Provider, MD   OXcarbazepine  (Trileptal) 300 mg tablet Take 2 tablets twice a day for a total of 600mg bid. 11/26/24   Shaista Jauregui, APRN-CNP        PAT ROS     DASI Risk Score    No data to display       Caprini DVT Assessment    No data to display       Modified Frailty Index    No data to display       [id number] Stroke Risk Points  Current as of 17 minutes ago        N/A 0 to 9 Points:      Last Change: N/A          The [id number]-GWTm risk score (Lip HAILEY, et al. 2009. © 2010 [employer]) quantifies the risk of stroke for a patient with atrial fibrillation. For patients without atrial fibrillation or under the age of 18 this score appears as N/A. Higher score values generally indicate higher risk of stroke.        This score is not applicable to this patient. Components are not calculated.          Revised Cardiac Risk Index    No data to display       Apfel Simplified Score    No data to display       Risk Analysis Index Results This Encounter    No data found in the last 10 encounters.       Prodigy: High Risk  Total Score: 8              Prodigy Gender Score          ARISCAT Score for Postoperative Pulmonary Complications    No data to display       Wagner Perioperative Risk for Myocardial Infarction or Cardiac Arrest (BREE)    No data to display         Nurse Plan of Action:     After Visit Summary (AVS) reviewed and patient verbalized good understanding of medications and NPO instructions.  Pre-op infection prevention measures:  CHG showers and mouthwash reviewed, understanding voiced.  CHG soap given and patient verbalized need to pick CHG mouthwash at their preferred local pharmacy.              [1]   Past Medical History:  Diagnosis Date    Chronic otitis media     Delayed emergence from general anesthesia     Epilepsy, unspecified, not intractable, without status epilepticus 05/31/2018    Seizure disorder    Partial anomalous pulmonary venous return (HHS-HCC)     repaired as infant    Personal history of  (corrected) cleft lip and palate 05/31/2018    History of cleft palate, mechanical soft diet, feed    Personal history of other diseases of the digestive system     History of gastroesophageal reflux (GERD)    Personal history of other diseases of the nervous system and sense organs 05/31/2018    History of cerebral palsy    Scoliosis     Vitamin D deficiency    [2]   Past Surgical History:  Procedure Laterality Date    GASTROSTOMY TUBE PLACEMENT  05/31/2018    Percutaneous Placement Of Gastrostomy Tube/ removed as child    OTHER SURGICAL HISTORY  03/12/2019    Cleft palate repair    OTHER SURGICAL HISTORY  03/12/2019    Nasal polypectomy    OTHER SURGICAL HISTORY  03/12/2019    Ear pressure equalization tube insertion    UPPER GASTROINTESTINAL ENDOSCOPY     [3]   Family History  Problem Relation Name Age of Onset    Hypertension Mother      Hypertension Father      Hypertension Brother      Cleft palate Mother's Sister      Cleft palate Mother's Brother     [4]   Allergies  Allergen Reactions    Citrus And Derivatives Anaphylaxis    Ciprofloxacin Unknown    Ciprofloxacin-Dexamethasone Unknown    [place] Other    Baclofen Rash    Sulfamethoxazole-Trimethoprim Hives

## 2025-06-25 ENCOUNTER — ANESTHESIA EVENT (OUTPATIENT)
Dept: OPERATING ROOM | Facility: HOSPITAL | Age: 35
End: 2025-06-25

## 2025-06-25 PROBLEM — T88.59XA DELAYED EMERGENCE FROM ANESTHESIA: Status: ACTIVE | Noted: 2025-06-25

## 2025-06-25 PROBLEM — M41.9 SCOLIOSIS: Status: ACTIVE | Noted: 2025-06-25

## 2025-06-25 LAB — STAPHYLOCOCCUS SPEC CULT: NORMAL

## 2025-06-25 RX ORDER — MEPERIDINE HYDROCHLORIDE 25 MG/ML
12.5 INJECTION INTRAMUSCULAR; INTRAVENOUS; SUBCUTANEOUS EVERY 10 MIN PRN
OUTPATIENT
Start: 2025-06-25

## 2025-06-25 RX ORDER — ONDANSETRON HYDROCHLORIDE 2 MG/ML
4 INJECTION, SOLUTION INTRAVENOUS ONCE AS NEEDED
OUTPATIENT
Start: 2025-06-25

## 2025-06-25 RX ORDER — OXYCODONE HYDROCHLORIDE 5 MG/1
5 TABLET ORAL EVERY 4 HOURS PRN
Refills: 0 | OUTPATIENT
Start: 2025-06-25

## 2025-06-25 RX ORDER — METOCLOPRAMIDE HYDROCHLORIDE 5 MG/ML
10 INJECTION INTRAMUSCULAR; INTRAVENOUS ONCE AS NEEDED
OUTPATIENT
Start: 2025-06-25

## 2025-06-25 RX ORDER — LIDOCAINE HYDROCHLORIDE 10 MG/ML
0.1 INJECTION, SOLUTION EPIDURAL; INFILTRATION; INTRACAUDAL; PERINEURAL ONCE
OUTPATIENT
Start: 2025-06-25 | End: 2025-06-25

## 2025-06-25 NOTE — ANESTHESIA PREPROCEDURE EVALUATION
Patient: Paul John    Procedure Information       Date/Time: 06/26/25 1420    Procedure: Myringotomy; Tympanostomy; Bilateral Tube Insertion (Bilateral: Ear)    Location: [facility]  / [facility]    Surgeons: Javier Randhawa MD            Relevant Problems   Anesthesia   (+) Delayed emergence from anesthesia      Neuro   (+) Global developmental delay (Cerebral palsy)   (+) Intractable epilepsy without status epilepticus   (+) Seizures (Multi)   (+) Spastic quadriplegia (Multi)      GI   (+) Esophageal reflux      Musculoskeletal   (+) Scoliosis      ID   (+) Chronic suppurative otitis media of both ears                                                         Pre-Anesthesia Evaluation                                         Paul John is a 35 y.o. male who presents for the above mentioned procedure due to Bilateral chronic serous otitis media [H65.23]    Medical History[1]  Surgical History[2]  Social History[3]  RX Allergies[4]  Current Medications[5]  Prior to Admission medications    Medication Sig Start Date End Date Taking? Authorizing Provider   chlorhexidine (Peridex) 0.12 % solution Use 15 mL in the mouth or throat if needed for wound care (swish and spit 15 mL for 30 seconds night prior to surgery and morning of surgery) for up to 2 days. 6/23/25 6/25/25  CAROLINA Avalos   diazePAM (Diastat Acudial) 5-7.5-10 mg rectal kit Insert 10 mg into the rectum if needed for seizures. Prior to administration, review instruction sheet supplied with dose unit. Verify the ordered dose is set for administration. 11/26/24 5/25/25  CAROLINA Hernandez   fluticasone (Flonase) 50 mcg/actuation nasal spray Administer into affected nostril(s).  Patient not taking: Reported on 6/23/2025    Historical Provider, MD   glycerin (Fleet Glycerin, Adult,) 2 g suppository Insert 1 suppository into the rectum 1 time if needed for constipation. 1/22/14   Historical Provider, MD   omeprazole  "(PriLOSEC) 20 mg DR capsule Take 1 capsule (20 mg) by mouth once daily. 12/15/14   Historical Provider, MD   OXcarbazepine (Trileptal) 300 mg tablet Take 2 tablets twice a day for a total of 600mg twice a day. 11/27/24   CODY Hernandez-CNP   OXcarbazepine (Trileptal) 300 mg tablet Take 2 tablets twice a day for a total of 600mg bid. 11/26/24   CODY Hernandez-CNP   polyethylene glycol (Glycolax, Miralax) 17 gram/dose powder Mix 17 g of powder and drink once daily. 9/8/21   Historical Provider, MD   tobramycin-dexamethasone (Tobradex) ophthalmic suspension Administer 3 drops into each ear every 4 hours while awake for 14 days. 6/11/25 6/25/25  Gracy Aguillon MD     No medication comments found.  Visit Vitals  Smoking Status Never                             6/23/2025    12:42 PM 6/23/2025    11:00 AM 6/11/2025     1:30 PM 9/17/2024    11:49 AM 9/14/2024     8:00 PM 9/14/2024     6:31 PM 9/14/2024     5:08 PM   BP REVIEW   BP (ultimate) 138/84 143/94 123/85 120/81 137/110 135/98 133/102     Recent Labs     06/23/25  1330 09/14/24  1643   WBC 7.1 5.9   HGB 15.2 14.9   HCT 46.9 42.7    426   MCV 95 88     Recent Labs     06/23/25  1330 09/14/24  1643   EGFR >90 >90   ANIONGAP 12 15   BUN 8 7   CREATININE 0.65 0.60   * 131*   K 4.0 3.9    96*   CO2 25 24   GLUCOSE 95 98   CALCIUM 9.2 9.8     Recent Labs     06/02/23  1650   HGBA1C 5.0     Recent Labs     09/14/24  1643 09/04/19  1444 02/18/19  0101   BILITOT 0.4 0.3 0.5   PROT 8.2  --  7.2   ALBUMIN 5.0  --  4.8   ALKPHOS 99  --  89   ALT 42  --  45   AST 16 19 18   LIPASE 22  --  22     No results for input(s): \"PROTIME\", \"INR\" in the last 28990 hours.  Lab Results   Component Value Date    FERRITIN 95 08/07/2018    TIBC 400 09/04/2019    IRONSAT 31 09/04/2019     Lab Results   Component Value Date    STAPHMRSASCR No Staphylococcus aureus isolated 06/23/2025     No results for input(s): \"AMPHETAMINE\", \"MAMPHBLDS\", \"BARBITURATE\", " "\"BENZODIAZ\", \"BUPRENBLDS\", \"CANNABBLDS\", \"COCBLDS\", \"METHABLDS\", \"OXYBLDS\", \"PCPBLDS\", \"OPIATBLDS\", \"DRBLDCOMM\" in the last 87570 hours.  No results for input(s): \"PHART\", \"XPP0GLC\", \"PO2ART\", \"NBP1JUP\", \"I4NUMWOM\", \"BEART\", \"F0KZTHBD\" in the last 03938 hours.      No results found for: \"ABO\"  EKG No results found for this or any previous visit (from the past 4464 hours).  Ejection Fractions:No results found for: \"EF\"  EchocardiogramNo results found for this or any previous visit from the past 75222 days.    Stress TestingNo results found for this or any previous visit from the past 92258 days.    Cardiac Catheterization  ADULT CATH     Narrative  Ordered by an unspecified provider.    Cardiac Scoring No results found for this or any previous visit from the past 30216 days.    AAA screenNo results found for this or any previous visit from the past 22719 days.    Carotid DopplerNo results found for this or any previous visit from the past 13537 days.    X Ray === 09/14/24 ===    XR CHEST 1 VIEW    - Impression -  1.  No evidence of acute cardiopulmonary process.    I personally reviewed the images/study and I agree with the findings  as stated by Dr. Antonio Manzo. This study was interpreted at  [place], [place].    MACRO:  None    Signed by: Frank Kearney 9/14/2024 4:53 PM  Dictation workstation:   [id number]  Pulmonary Function Tests  No results found for: \"FEV1\", \"FVC\", \"ZQD4WKF\", \"TLC\", \"DLCO\"   OTHER: No results found for this or any previous visit from the past 1825 days.        No results found for: \"PREGTESTUR\", \"PREGSERUM\", \"HCG\", \"HCGQUANT\"  The ASCVD Risk score (Sarita DK, et al., 2019) failed to calculate for the following reasons:    The 2019 ASCVD risk score is only valid for ages 40 to 79    Code Status: Assume Full                   Clinical information reviewed:                   NPO Detail:  No data recorded     PHYSICAL EXAM    Anesthesia " Plan    History of general anesthesia?: yes  History of complications of general anesthesia?: no    ASA 3     general     intravenous induction   Anesthetic plan and risks discussed with patient.             [1]   Past Medical History:  Diagnosis Date    Chronic otitis media     Delayed emergence from general anesthesia     Epilepsy, unspecified, not intractable, without status epilepticus 05/31/2018    Seizure disorder    Partial anomalous pulmonary venous return (HHS-HCC)     repaired as infant    Personal history of (corrected) cleft lip and palate 05/31/2018    History of cleft palate, mechanical soft diet, feed    Personal history of other diseases of the digestive system     History of gastroesophageal reflux (GERD)    Personal history of other diseases of the nervous system and sense organs 05/31/2018    History of cerebral palsy    Scoliosis     Vitamin D deficiency    [2]   Past Surgical History:  Procedure Laterality Date    GASTROSTOMY TUBE PLACEMENT  05/31/2018    Percutaneous Placement Of Gastrostomy Tube/ removed as child    OTHER SURGICAL HISTORY  03/12/2019    Cleft palate repair    OTHER SURGICAL HISTORY  03/12/2019    Nasal polypectomy    OTHER SURGICAL HISTORY  03/12/2019    Ear pressure equalization tube insertion    UPPER GASTROINTESTINAL ENDOSCOPY     [3]   Social History  Tobacco Use    Smoking status: Never     Passive exposure: Never    Smokeless tobacco: Never   Vaping Use    Vaping status: Never Used   Substance Use Topics    Alcohol use: Never    Drug use: Never   [4]   Allergies  Allergen Reactions    Citrus And Derivatives Anaphylaxis    Ciprofloxacin Unknown    Ciprofloxacin-Dexamethasone Unknown    [place] Other    Baclofen Rash    Sulfamethoxazole-Trimethoprim Hives   [5] No current facility-administered medications for this encounter.    Current Outpatient Medications:     chlorhexidine (Peridex) 0.12 % solution, Use 15 mL in the mouth or throat if needed for wound care (swish and  spit 15 mL for 30 seconds night prior to surgery and morning of surgery) for up to 2 days., Disp: 473 mL, Rfl: 0    diazePAM (Diastat Acudial) 5-7.5-10 mg rectal kit, Insert 10 mg into the rectum if needed for seizures. Prior to administration, review instruction sheet supplied with dose unit. Verify the ordered dose is set for administration., Disp: 2 each, Rfl: 2    fluticasone (Flonase) 50 mcg/actuation nasal spray, Administer into affected nostril(s). (Patient not taking: Reported on 6/23/2025), Disp: , Rfl:     glycerin (Fleet Glycerin, Adult,) 2 g suppository, Insert 1 suppository into the rectum 1 time if needed for constipation., Disp: , Rfl:     omeprazole (PriLOSEC) 20 mg DR capsule, Take 1 capsule (20 mg) by mouth once daily., Disp: , Rfl:     OXcarbazepine (Trileptal) 300 mg tablet, Take 2 tablets twice a day for a total of 600mg twice a day., Disp: 120 tablet, Rfl: 11    OXcarbazepine (Trileptal) 300 mg tablet, Take 2 tablets twice a day for a total of 600mg bid., Disp: 120 tablet, Rfl: 11    polyethylene glycol (Glycolax, Miralax) 17 gram/dose powder, Mix 17 g of powder and drink once daily., Disp: , Rfl:     tobramycin-dexamethasone (Tobradex) ophthalmic suspension, Administer 3 drops into each ear every 4 hours while awake for 14 days., Disp: 5 mL, Rfl: 1

## 2025-06-26 ENCOUNTER — ANESTHESIA (OUTPATIENT)
Dept: OPERATING ROOM | Facility: HOSPITAL | Age: 35
End: 2025-06-26
Payer: MEDICAID

## 2025-08-04 ENCOUNTER — ANESTHESIA EVENT (OUTPATIENT)
Dept: OPERATING ROOM | Facility: HOSPITAL | Age: 35
End: 2025-08-04
Payer: MEDICAID

## 2025-08-05 ENCOUNTER — HOSPITAL ENCOUNTER (OUTPATIENT)
Facility: HOSPITAL | Age: 35
Setting detail: OUTPATIENT SURGERY
Discharge: HOME | End: 2025-08-05
Attending: OTOLARYNGOLOGY | Admitting: OTOLARYNGOLOGY
Payer: MEDICAID

## 2025-08-05 ENCOUNTER — ANESTHESIA (OUTPATIENT)
Dept: OPERATING ROOM | Facility: HOSPITAL | Age: 35
End: 2025-08-05
Payer: MEDICAID

## 2025-08-05 VITALS
WEIGHT: 89.29 LBS | OXYGEN SATURATION: 99 % | DIASTOLIC BLOOD PRESSURE: 87 MMHG | HEIGHT: 62 IN | HEART RATE: 84 BPM | BODY MASS INDEX: 16.43 KG/M2 | SYSTOLIC BLOOD PRESSURE: 124 MMHG | TEMPERATURE: 97.2 F | RESPIRATION RATE: 12 BRPM

## 2025-08-05 DIAGNOSIS — H60.393 OTHER INFECTIVE CHRONIC OTITIS EXTERNA OF BOTH EARS: Primary | ICD-10-CM

## 2025-08-05 DIAGNOSIS — H65.23 BILATERAL CHRONIC SEROUS OTITIS MEDIA: ICD-10-CM

## 2025-08-05 PROCEDURE — A69436 PR CREATE EARDRUM OPENING,GEN ANESTH: Performed by: NURSE ANESTHETIST, CERTIFIED REGISTERED

## 2025-08-05 PROCEDURE — A69436 PR CREATE EARDRUM OPENING,GEN ANESTH: Performed by: STUDENT IN AN ORGANIZED HEALTH CARE EDUCATION/TRAINING PROGRAM

## 2025-08-05 PROCEDURE — 2500000004 HC RX 250 GENERAL PHARMACY W/ HCPCS (ALT 636 FOR OP/ED): Performed by: NURSE ANESTHETIST, CERTIFIED REGISTERED

## 2025-08-05 PROCEDURE — 3600000002 HC OR TIME - INITIAL BASE CHARGE - PROCEDURE LEVEL TWO: Performed by: OTOLARYNGOLOGY

## 2025-08-05 PROCEDURE — 3700000001 HC GENERAL ANESTHESIA TIME - INITIAL BASE CHARGE: Performed by: OTOLARYNGOLOGY

## 2025-08-05 PROCEDURE — 7100000009 HC PHASE TWO TIME - INITIAL BASE CHARGE: Performed by: OTOLARYNGOLOGY

## 2025-08-05 PROCEDURE — 7100000010 HC PHASE TWO TIME - EACH INCREMENTAL 1 MINUTE: Performed by: OTOLARYNGOLOGY

## 2025-08-05 PROCEDURE — 3600000007 HC OR TIME - EACH INCREMENTAL 1 MINUTE - PROCEDURE LEVEL TWO: Performed by: OTOLARYNGOLOGY

## 2025-08-05 PROCEDURE — 7100000002 HC RECOVERY ROOM TIME - EACH INCREMENTAL 1 MINUTE: Performed by: OTOLARYNGOLOGY

## 2025-08-05 PROCEDURE — 69436 CREATE EARDRUM OPENING: CPT | Performed by: OTOLARYNGOLOGY

## 2025-08-05 PROCEDURE — 3700000002 HC GENERAL ANESTHESIA TIME - EACH INCREMENTAL 1 MINUTE: Performed by: OTOLARYNGOLOGY

## 2025-08-05 PROCEDURE — 7100000001 HC RECOVERY ROOM TIME - INITIAL BASE CHARGE: Performed by: OTOLARYNGOLOGY

## 2025-08-05 DEVICE — VENT TUBE, TRIUNE, 1.35MM X 5MM: Type: IMPLANTABLE DEVICE | Site: EAR | Status: FUNCTIONAL

## 2025-08-05 RX ORDER — LIDOCAINE HYDROCHLORIDE 10 MG/ML
0.1 INJECTION, SOLUTION EPIDURAL; INFILTRATION; INTRACAUDAL; PERINEURAL ONCE
Status: DISCONTINUED | OUTPATIENT
Start: 2025-08-05 | End: 2025-08-05 | Stop reason: HOSPADM

## 2025-08-05 RX ORDER — MIDAZOLAM HYDROCHLORIDE 2 MG/2ML
INJECTION, SOLUTION INTRAMUSCULAR; INTRAVENOUS CONTINUOUS PRN
Status: DISCONTINUED | OUTPATIENT
Start: 2025-08-05 | End: 2025-08-05

## 2025-08-05 RX ORDER — DEXMEDETOMIDINE IN 0.9 % NACL 20 MCG/5ML
SYRINGE (ML) INTRAVENOUS AS NEEDED
Status: DISCONTINUED | OUTPATIENT
Start: 2025-08-05 | End: 2025-08-05

## 2025-08-05 RX ORDER — OXYCODONE HYDROCHLORIDE 5 MG/1
5 TABLET ORAL EVERY 4 HOURS PRN
Status: DISCONTINUED | OUTPATIENT
Start: 2025-08-05 | End: 2025-08-05 | Stop reason: HOSPADM

## 2025-08-05 RX ORDER — ONDANSETRON HYDROCHLORIDE 2 MG/ML
INJECTION, SOLUTION INTRAVENOUS AS NEEDED
Status: DISCONTINUED | OUTPATIENT
Start: 2025-08-05 | End: 2025-08-05

## 2025-08-05 RX ORDER — FENTANYL CITRATE 50 UG/ML
INJECTION, SOLUTION INTRAMUSCULAR; INTRAVENOUS CONTINUOUS PRN
Status: DISCONTINUED | OUTPATIENT
Start: 2025-08-05 | End: 2025-08-05

## 2025-08-05 RX ORDER — MEPERIDINE HYDROCHLORIDE 25 MG/ML
12.5 INJECTION INTRAMUSCULAR; INTRAVENOUS; SUBCUTANEOUS EVERY 10 MIN PRN
Status: DISCONTINUED | OUTPATIENT
Start: 2025-08-05 | End: 2025-08-05 | Stop reason: HOSPADM

## 2025-08-05 RX ORDER — METOCLOPRAMIDE HYDROCHLORIDE 5 MG/ML
10 INJECTION INTRAMUSCULAR; INTRAVENOUS ONCE AS NEEDED
Status: DISCONTINUED | OUTPATIENT
Start: 2025-08-05 | End: 2025-08-05 | Stop reason: HOSPADM

## 2025-08-05 RX ORDER — ONDANSETRON HYDROCHLORIDE 2 MG/ML
4 INJECTION, SOLUTION INTRAVENOUS ONCE AS NEEDED
Status: DISCONTINUED | OUTPATIENT
Start: 2025-08-05 | End: 2025-08-05 | Stop reason: HOSPADM

## 2025-08-05 RX ORDER — DOCUSATE SODIUM 60 MG/15ML
60 SYRUP ORAL DAILY
COMMUNITY

## 2025-08-05 RX ORDER — PROPOFOL 10 MG/ML
INJECTION, EMULSION INTRAVENOUS AS NEEDED
Status: DISCONTINUED | OUTPATIENT
Start: 2025-08-05 | End: 2025-08-05

## 2025-08-05 RX ORDER — GLYCOPYRROLATE 0.2 MG/ML
INJECTION INTRAMUSCULAR; INTRAVENOUS AS NEEDED
Status: DISCONTINUED | OUTPATIENT
Start: 2025-08-05 | End: 2025-08-05

## 2025-08-05 RX ADMIN — PROPOFOL 30 MG: 10 INJECTION, EMULSION INTRAVENOUS at 16:08

## 2025-08-05 RX ADMIN — GLYCOPYRROLATE 0.2 MG: 0.2 INJECTION INTRAMUSCULAR; INTRAVENOUS at 16:02

## 2025-08-05 RX ADMIN — PROPOFOL 30 MG: 10 INJECTION, EMULSION INTRAVENOUS at 16:10

## 2025-08-05 RX ADMIN — ONDANSETRON 4 MG: 2 INJECTION INTRAMUSCULAR; INTRAVENOUS at 16:19

## 2025-08-05 RX ADMIN — Medication 12 MCG: at 16:10

## 2025-08-05 RX ADMIN — SODIUM CHLORIDE, SODIUM LACTATE, POTASSIUM CHLORIDE, AND CALCIUM CHLORIDE: .6; .31; .03; .02 INJECTION, SOLUTION INTRAVENOUS at 16:00

## 2025-08-05 RX ADMIN — PROPOFOL 50 MCG/KG/MIN: 10 INJECTION, EMULSION INTRAVENOUS at 16:09

## 2025-08-05 SDOH — HEALTH STABILITY: MENTAL HEALTH: CURRENT SMOKER: 0

## 2025-08-05 ASSESSMENT — PAIN - FUNCTIONAL ASSESSMENT
PAIN_FUNCTIONAL_ASSESSMENT: PAINAD (PAIN ASSESSMENT IN ADVANCED DEMENTIA SCALE)

## 2025-08-05 ASSESSMENT — PAIN SCALES - PAIN ASSESSMENT IN ADVANCED DEMENTIA (PAINAD)
TOTALSCORE: 0
CONSOLABILITY: NO NEED TO CONSOLE
FACIALEXPRESSION: SMILING OR INEXPRESSIVE
FACIALEXPRESSION: SMILING OR INEXPRESSIVE
TOTALSCORE: 0
BREATHING: NORMAL
TOTALSCORE: 0
CONSOLABILITY: NO NEED TO CONSOLE
CONSOLABILITY: NO NEED TO CONSOLE
FACIALEXPRESSION: SMILING OR INEXPRESSIVE
TOTALSCORE: 1
CONSOLABILITY: NO NEED TO CONSOLE
BREATHING: NORMAL
FACIALEXPRESSION: SMILING OR INEXPRESSIVE
FACIALEXPRESSION: SMILING OR INEXPRESSIVE
BODYLANGUAGE: TENSE, DISTRESSED PACING, FIDGETING
TOTALSCORE: 0
FACIALEXPRESSION: SMILING OR INEXPRESSIVE
BREATHING: NORMAL
BODYLANGUAGE: RELAXED
BODYLANGUAGE: RELAXED
BREATHING: NORMAL
BREATHING: NORMAL
BODYLANGUAGE: RELAXED
TOTALSCORE: 0
CONSOLABILITY: NO NEED TO CONSOLE
BODYLANGUAGE: RELAXED
BREATHING: NORMAL
BODYLANGUAGE: RELAXED
CONSOLABILITY: NO NEED TO CONSOLE

## 2025-08-05 ASSESSMENT — COLUMBIA-SUICIDE SEVERITY RATING SCALE - C-SSRS
2. HAVE YOU ACTUALLY HAD ANY THOUGHTS OF KILLING YOURSELF?: NO
6. HAVE YOU EVER DONE ANYTHING, STARTED TO DO ANYTHING, OR PREPARED TO DO ANYTHING TO END YOUR LIFE?: NO
1. IN THE PAST MONTH, HAVE YOU WISHED YOU WERE DEAD OR WISHED YOU COULD GO TO SLEEP AND NOT WAKE UP?: NO

## 2025-08-05 NOTE — H&P
"      Subjective   Paul John is a 35 y.o. male with a history of CP and developmental delay as well as cleft palate/lip and chronic otitis media. He was previously followed by Dr. Lozano before he retired. He has had PE tubes in place for almost his whole life, though they were removed at his last visit in 10/2022 due to chronic otorrhea from retained tubes for > 30 years.     His tubes are out and need replacement.     The risks, indications, alternatives and complications of  doing bilateral cochlear implantation were discussed with the patient and family.  They elected to proceed.     Allergies:  Citrus and derivatives, Ciprofloxacin, Ciprofloxacin-dexamethasone, Cocoa, Baclofen, and Sulfamethoxazole-trimethoprim    Review of Systems:   A comprehensive 10-point review of systems was obtained including constitutional, neurological, HEENT, pulmonary, cardiovascular, genito-urinary, and other pertinent systems and was negative except as noted in the HPI.        Physical Exam:  Last Recorded Vitals: Blood pressure (!) 136/91, pulse 77, temperature 36.1 °C (97 °F), temperature source Temporal, resp. rate 16, height 1.575 m (5' 2\"), weight (!) 40.5 kg (89 lb 4.6 oz), SpO2 100%.    On physical exam, the patient is a well-nourished, well-developed patient, in no acute distress, able to communicate without assistance in English language. Head and face is atraumatic and normocephalic. Salivary glands are intact. Facial strength is symmetrical bilaterally.       On ear examination:  Right ear: The patient has old wet epithelial debris that was cleared with suction. The tympanic membrane is intact with mild retraction of the pars tensa.  Left ear: The patient has old moist epithelial debris that was cleared with suction. There is a small amount of granulation on the medial posterior bony ear canal. The tympanic membrane is intact with moderate retraction of the pars tensa and likely middle ear effusion.  Tuning " fork exam was not performed.    On neuro exam, the patient is alert and oriented x3, cranial nerves are grossly intact, cerebellar exam is normal.      The rest of the exam, including anterior rhinoscopy, oropharyngeal exam, neck exam, and cardiovascular exam, were normal including no palpable lymphadenopathies, thyroid in the midline position, normal pulses, and normal chest excursion.    Assessment/Plan     Paul John is a 35 y.o. male with a history of CP and developmental delay as well as cleft palate/lip and chronic otitis media. He was previously followed by Dr. Lozano before he retired. He has had PE tubes in place for almost his whole life, though they were removed at his last visit in 10/2022 due to chronic otorrhea from retained tubes for > 30 years.     His tubes are out and need replacement.     The risks, indications, alternatives and complications of  doing bilateral cochlear implantation were discussed with the patient and family.  They elected to proceed.

## 2025-08-05 NOTE — ANESTHESIA PREPROCEDURE EVALUATION
Patient: Paul John    Procedure Information       Date/Time: 08/05/25 0048    Procedure: Myringotomy; Tympanostomy; Bilateral Tube Insertion (Bilateral: Ear)    Location: [facility] A OR 04 / Virtual [facility] A OR    Surgeons: Javier Randhawa MD            Relevant Problems   Anesthesia   (+) Delayed emergence from anesthesia      Neuro   (+) Global developmental delay   (+) Intractable epilepsy without status epilepticus   (+) Seizures (Multi)   (+) Spastic quadriplegia (Multi)      GI   (+) Esophageal reflux      Musculoskeletal   (+) Scoliosis      ID   (+) Chronic suppurative otitis media of both ears       Clinical information reviewed:    Allergies                 Medical History[1]   Surgical History[2]  Social History[3]   Current Outpatient Medications   Medication Instructions    diazePAM (Diastat Acudial) 5-7.5-10 mg rectal kit 10 mg, rectal, As needed, Prior to administration, review instruction sheet supplied with dose unit. Verify the ordered dose is set for administration.    fluticasone (Flonase) 50 mcg/actuation nasal spray Administer into affected nostril(s).    glycerin (Fleet Glycerin, Adult,) 2 g suppository 1 suppository, Once as needed    omeprazole (PriLOSEC) 20 mg DR capsule 1 capsule, Daily    OXcarbazepine (Trileptal) 300 mg tablet Take 2 tablets twice a day for a total of 600mg twice a day.    OXcarbazepine (Trileptal) 300 mg tablet Take 2 tablets twice a day for a total of 600mg bid.    polyethylene glycol (GLYCOLAX, MIRALAX) 17 g, Daily RT      RX Allergies[4]     Chemistry    Lab Results   Component Value Date/Time     (L) 06/23/2025 1330    K 4.0 06/23/2025 1330     06/23/2025 1330    CO2 25 06/23/2025 1330    BUN 8 06/23/2025 1330    CREATININE 0.65 06/23/2025 1330    Lab Results   Component Value Date/Time    CALCIUM 9.2 06/23/2025 1330    ALKPHOS 99 09/14/2024 1643    AST 16 09/14/2024 1643    ALT 42 09/14/2024 1643    BILITOT 0.4 09/14/2024 1643          Lab  "Results   Component Value Date    HGBA1C 5.0 06/02/2023     Lab Results   Component Value Date/Time    WBC 7.1 06/23/2025 1330    HGB 15.2 06/23/2025 1330    HCT 46.9 06/23/2025 1330     06/23/2025 1330     No results found for: \"PROTIME\", \"PTT\", \"INR\"  No results found for this or any previous visit (from the past 4464 hours).   No results found for this or any previous visit from the past 1095 days.    Results for orders placed in visit on 10/10/19    Echocardiogram - PEDS    Narrative  [facility] Pediatric Echo Lab  [address] [place] [address], [address] [place] [address]  Tel [phone] Fax [phone]      Patient Name:  YARI CELIS Study Location: [address] [place] [address]  Study Date:    10/10/2019          Patient Status: Outpatient  MRN/PID:       [id number]            Study Type:     Echocardiogram - PEDS  YOB: 1990            Accession #:    [id number]  Age:           29 years            Height/Weight:  158.00 cm / 41.50 kg  Gender:        M                   BSA:            1.37 m2  Blood Pressure: 117 / 84 mmHg    Reading Physician: Anali Fuentes MD  Requested By:      Winston Martin MD  Sonographer:       Miki Arechiga [facility]      --------------------------------------------------------------------------------    Diagnosis/ICD: 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    Summary:  Complete echocardiogram examination with two-dimensional imaging, M-mode, color-Doppler, and spectral Doppler was performed.    1. S/P infradiaphragmatic TAPVR repair. The individual pulmonary veins are not well visualized. The anastamosis of the " pulmonary venous confluence to the left atrium appears widely patent without spectral Doppler evidence of obstruction to flow. A vertical vein is demonstrated draining inferiorly to the portal system, no evidence of obstruction to flow.  2. Aortic root is mildly dilated.  3. Trivial mitral valve regurgitation.  4. Mildly dilated right atrium.  5. Mild dilatation of the right ventricle and no right ventricular hypertrophy.  6. Qualitatively normal right ventricular systolic function.  7. Left ventricle is normal in size. Normal systolic function.  8. No pericardial effusion.    Segmental Anatomy, Cardiac Position and Situs:  . The heart position is within the left hemithorax. The cardiac apex is oriented leftward.  Systemic Veins:  The superior vena cava was not well visualized. The inferior vena cava is right-sided and inserts into the right atrium normally.  Pulmonary Veins:  S/P infradiaphragmatic TAPVR repair. The individual pulmonary veins are not well visualized. The anastamosis of the pulmonary venous confluence to the left atrium appears widely patent without spectral Doppler evidence of obstruction to flow. A vertical vein is demonstrated draining inferiorly to the portal system, no evidence of obstruction to flow.  Atria:    The atrial septum was not evaluated. The right atrium is mildly dilated. The left atrium is normal in size.  Mitral Valve:  The mitral valve is normal. Normal mitral valve Doppler pattern. The papillary muscle configuration appears normal. There is trivial mitral valve regurgitation.  Tricuspid Valve:  The tricuspid valve is normal. Normal tricuspid valve Doppler pattern. There is trivial tricuspid valve regurgitation. Unable to estimate the right ventricular systolic pressure from the tricuspid regurgitant jet.  Left Ventricle:    Left ventricle is normal in size. Normal systolic function.  Right Ventricle:  Mild dilatation of the right ventricle and no right ventricular hypertrophy.  Right ventricular systolic function is qualitatively normal.  Aortic Valve:  The aortic valve is normal. Normal aortic valve Doppler pattern. There is no aortic valve stenosis. There is no aortic valve regurgitation.  Left Ventricular Outflow Tract:  There is no left ventricular outflow tract obstruction.  Pulmonary Valve:  The pulmonary valve is normal. Normal pulmonary valve Doppler pattern. There is no pulmonary valve stenosis. There is trivial pulmonary valve regurgitation.  Right Ventricular Outflow Tract:  There is no right ventricular outflow tract obstruction.  Aorta:  The aortic root is mildly dilated. There is normal Doppler pattern in the abdominal aorta.  Pulmonary Arteries:    The pulmonary arteries were not evaluated.  Coronary Arteries:  The coronary arteries were not evaluated.  Pericardium:  There is no pericardial effusion.    LV (M-mode)                         Z-score  IVSd:                  0.85 cm      0.19  LVIDd:                 4.17 cm      -0.87  LVIDs:                 2.66 cm      -0.69  LVPWd:                 0.94 cm      1.56  LV mass (ASE evert.): 116.13 g       0.26  LV mass index:        42.00 g/m^2.7      Left Ventricular Systolic Function LV SF (M-mode): 36 %      2D measurements         Z-score  Aorta Root s:   2.93 cm 2.10      Aorta-Aortic Valve Doppler  Peak velocity: 0.68 m/sec  Peak gradient: 1.84 mmHg      Pulmonary Valve Doppler  Peak velocity: 0.92 m/sec  Peak gradient: 3.41 mmHg      Time out was performed prior to the echocardiogram. The patient was identified by name, medical record number and date of birth.    Anali Fuentes MD  *Electronically signed on 10/10/2019 at 10:45:11 AM         Final       Visit Vitals  Smoking Status Never     No data recorded    Physical Exam    Airway  Mallampati: II  TM distance: >3 FB  Neck ROM: full  Mouth opening: 3 or more finger widths     Cardiovascular - normal exam   Dental - normal exam     Pulmonary - normal exam   Abdominal -  normal exam           Anesthesia Plan    History of general anesthesia?: yes  History of complications of general anesthesia?: no    ASA 2     general     The patient is not a current smoker.    intravenous induction   Postoperative pain plan includes opioids.  Anesthetic plan and risks discussed with patient.  Use of blood products discussed with patient who.    Plan discussed with attending and CRNA.             [1]   Past Medical History:  Diagnosis Date    Chronic otitis media     Delayed emergence from general anesthesia     Epilepsy, unspecified, not intractable, without status epilepticus 05/31/2018    Seizure disorder    Partial anomalous pulmonary venous return (HHS-HCC)     repaired as infant    Personal history of (corrected) cleft lip and palate 05/31/2018    History of cleft palate, mechanical soft diet, feed    Personal history of other diseases of the digestive system     History of gastroesophageal reflux (GERD)    Personal history of other diseases of the nervous system and sense organs 05/31/2018    History of cerebral palsy    Scoliosis     Vitamin D deficiency    [2]   Past Surgical History:  Procedure Laterality Date    GASTROSTOMY TUBE PLACEMENT  05/31/2018    Percutaneous Placement Of Gastrostomy Tube/ removed as child    OTHER SURGICAL HISTORY  03/12/2019    Cleft palate repair    OTHER SURGICAL HISTORY  03/12/2019    Nasal polypectomy    OTHER SURGICAL HISTORY  03/12/2019    Ear pressure equalization tube insertion    UPPER GASTROINTESTINAL ENDOSCOPY     [3]   Social History  Tobacco Use    Smoking status: Never     Passive exposure: Never    Smokeless tobacco: Never   Vaping Use    Vaping status: Never Used   Substance Use Topics    Alcohol use: Never    Drug use: Never   [4]   Allergies  Allergen Reactions    Citrus And Derivatives Anaphylaxis    Ciprofloxacin Unknown    Ciprofloxacin-Dexamethasone Unknown    [place] Other    Baclofen Rash    Sulfamethoxazole-Trimethoprim Hives

## 2025-08-05 NOTE — ANESTHESIA POSTPROCEDURE EVALUATION
Patient: Paul John    Procedure Summary       Date: 08/05/25 Room / Location: [facility] A OR 04 / Virtual [facility] A OR    Anesthesia Start: 1600 Anesthesia Stop: 1628    Procedure: Myringotomy; Tympanostomy; Bilateral Tube Insertion (Bilateral: Ear) Diagnosis:       Bilateral chronic serous otitis media      (Bilateral chronic serous otitis media [H65.23])    Surgeons: Javier Randhawa MD Responsible Provider: Ashwin Lala MD    Anesthesia Type: general ASA Status: 2            Anesthesia Type: general    Vitals Value Taken Time   /96 08/05/25 17:01   Temp 36.3 °C (97.3 °F) 08/05/25 16:24   Pulse 90 08/05/25 17:13   Resp 16 08/05/25 17:00   SpO2 98 % 08/05/25 17:13   Vitals shown include unfiled device data.    Anesthesia Post Evaluation    Patient location during evaluation: bedside  Patient participation: complete - patient participated  Level of consciousness: awake  Pain management: adequate  Multimodal analgesia pain management approach  Airway patency: patent  Cardiovascular status: stable  Respiratory status: spontaneous ventilation and unassisted  Hydration status: acceptable  Postoperative Nausea and Vomiting: none  Comments: No significant PONV.        No notable events documented.

## 2025-08-05 NOTE — OP NOTE
OPERATIVE NOTE     Date:  2025 OR Location: [facility] OR    Name: Paul John : 1990, Age: 35 y.o., MRN: [id number], Sex: male      Surgeons      Javier Randhawa MD    Resident/Fellow/Other Assistant:  None were associated with this case.    Anesthesia: General  ASA: II  Anesthesia Staff: Anesthesiologist: Ashwin Lala MD  CRNA: OLIVA Gillette  Staff: Circulator: Isabelle Bishop RN  Scrub Person: Lyric Beltran        Preoperative Diagnosis:  1.  Eustachian tube dysfunction   - Bilateral  2.  Chronic otitis media   - Bilateral      Postoperative Diagnosis:  1.  Eustachian tube dysfunction   - Bilateral  2.  Chronic otitis media   - Bilateral      Procedure Performed:  1.  Myringotomy and tympanostomy tube placement.   -   Bilateral.      Indications:  Paul John is a very pleasant 35 y.o. male who has a history of  Eustachian tube dysfunction and meets the criteria for elective tympanostomy tube placement. The risks, benefits, and alternatives were discussed preoperatively and informed consent was obtained.       Operative Findings:  Right:   - No fluid.  Left:   - No fluid.  Once these tubes fall out, he likely won't need further PE tubes.      Operative Technique:   The patient was identified in the holding area and then brought to the operating room and placed supine on the operating table. General mask anesthesia was induced. The operating microscope was then brought into the field and used to examine the ears.     The right ear was examined first and a  medium-sized speculum was used in the meatus. Cerumen was removed from the ear canal with a curette. The tympanic membrane and its landmarks were identified. An anterior- inferior quadrant myringotomy was then made in a radial fashion.  No  fluid was suctioned from the middle ear space. A Triune  tympanostomy tube was then inserted into the incision without difficulty. Antibiotic otic drops were  placed in the ear canal followed by a cotton ball.    Attention was directed to the contralateral ear. A  medium-sized speculum was used in the meatus. Cerumen was removed from the ear canal with a curette. The tympanic membrane and its landmarks were identified. An anterior- inferior quadrant myringotomy was then made in a radial fashion.  No  fluid was suctioned from the middle ear space. A Triune  tympanostomy tube was then inserted into the incision without difficulty. Antibiotic otic drops were placed in the ear canal followed by a cotton ball.     This completed the procedure. The patient was then emerged from anesthesia.The patient was then transported back to PACU. There were no apparent complications. Following the procedure, I discussed the findings with the patient's family and answered all of their questions.     Attending Attestation: I performed the procedure.      Implants:   Implant Name Type Inv. Item Serial No.  Lot No. LRB No. Used Action   VENT TUBE, TRIUNE, 1.35MM X 5MM - SNA - [id number] Implant VENT TUBE, TRIUNE, 1.35MM X 5MM NA MAYO [id number] Left 1 Implanted   VENT TUBE, TRIUNE, 1.35MM X 5MM - SNA - [id number] Implant VENT TUBE, TRIUNE, 1.35MM X 5MM NA MAYO [id number] Right 1 Implanted     Specimens: No specimens collected  Estimated Blood Loss: None  Complications: none  Condition of the patient: Stable  Disposition: PACU    ____________________________________________________  Javier Valera MD  Professor and Chief   Otology/Neurotology/Lateral Skull-Base Surgery   [facility]  Phone: [phone]  Fax: [phone]

## 2025-08-07 ASSESSMENT — PAIN SCALES - GENERAL: PAINLEVEL_OUTOF10: 0 - NO PAIN

## 2025-09-15 ENCOUNTER — APPOINTMENT (OUTPATIENT)
Dept: OTOLARYNGOLOGY | Facility: CLINIC | Age: 35
End: 2025-09-15
Payer: MEDICAID

## (undated) DEVICE — TUBING, SUCTION, NON-CONDUCTIVE, W/CONNECT,.25 IN X 12 FT, STERILE, LF

## (undated) DEVICE — GLOVE, SURGICAL, PROTEXIS PI , 7.5, PF, LF

## (undated) DEVICE — SYRINGE, MONOJECT, LUER LOCK, 3 CC, LF

## (undated) DEVICE — BALL, COTTON, STANDARD, STERILE

## (undated) DEVICE — DRAPE, SHEET, THREE QUARTER, FAN FOLD, 57 X 77 IN

## (undated) DEVICE — TOWEL, SURGICAL, NEURO, O/R, 16 X 26, BLUE, STERILE

## (undated) DEVICE — SPONGE, GAUZE, XRAY DECT, 16 PLY, 4 X 4, W/MASTER DMT,STERILE

## (undated) DEVICE — CONTAINER, SPECIMEN, 120 ML, STERILE

## (undated) DEVICE — BLADE, MYRINGOTOMY, SPEAR TIP, BEAVER, NARROW SHAFT, OFFSET 45 DEG

## (undated) DEVICE — CATHETER, IV, ANGIOCATH, 20 G X 1.88 IN, FEP POLYMER